# Patient Record
Sex: FEMALE
[De-identification: names, ages, dates, MRNs, and addresses within clinical notes are randomized per-mention and may not be internally consistent; named-entity substitution may affect disease eponyms.]

---

## 2018-12-11 PROBLEM — Z00.00 ENCOUNTER FOR PREVENTIVE HEALTH EXAMINATION: Status: ACTIVE | Noted: 2018-12-11

## 2018-12-12 ENCOUNTER — APPOINTMENT (OUTPATIENT)
Dept: PULMONOLOGY | Facility: CLINIC | Age: 83
End: 2018-12-12

## 2019-01-03 ENCOUNTER — APPOINTMENT (OUTPATIENT)
Dept: PULMONOLOGY | Facility: CLINIC | Age: 84
End: 2019-01-03
Payer: MEDICARE

## 2019-01-03 VITALS
DIASTOLIC BLOOD PRESSURE: 82 MMHG | BODY MASS INDEX: 35.68 KG/M2 | OXYGEN SATURATION: 97 % | TEMPERATURE: 98.1 F | HEART RATE: 60 BPM | WEIGHT: 189 LBS | SYSTOLIC BLOOD PRESSURE: 120 MMHG | RESPIRATION RATE: 16 BRPM | HEIGHT: 61 IN

## 2019-01-03 DIAGNOSIS — R06.02 SHORTNESS OF BREATH: ICD-10-CM

## 2019-01-03 DIAGNOSIS — Z86.39 PERSONAL HISTORY OF OTHER ENDOCRINE, NUTRITIONAL AND METABOLIC DISEASE: ICD-10-CM

## 2019-01-03 DIAGNOSIS — Z78.9 OTHER SPECIFIED HEALTH STATUS: ICD-10-CM

## 2019-01-03 DIAGNOSIS — Z87.891 PERSONAL HISTORY OF NICOTINE DEPENDENCE: ICD-10-CM

## 2019-01-03 DIAGNOSIS — Z86.79 PERSONAL HISTORY OF OTHER DISEASES OF THE CIRCULATORY SYSTEM: ICD-10-CM

## 2019-01-03 DIAGNOSIS — R05 COUGH: ICD-10-CM

## 2019-01-03 PROCEDURE — 99204 OFFICE O/P NEW MOD 45 MIN: CPT | Mod: 25

## 2019-01-03 PROCEDURE — 94729 DIFFUSING CAPACITY: CPT

## 2019-01-03 PROCEDURE — 88738 HGB QUANT TRANSCUTANEOUS: CPT

## 2019-01-03 PROCEDURE — 94060 EVALUATION OF WHEEZING: CPT

## 2019-01-03 PROCEDURE — 94727 GAS DIL/WSHOT DETER LNG VOL: CPT

## 2019-01-03 RX ORDER — ATENOLOL 50 MG/1
TABLET ORAL
Refills: 0 | Status: ACTIVE | COMMUNITY

## 2019-01-03 RX ORDER — SIMVASTATIN 80 MG/1
TABLET, FILM COATED ORAL
Refills: 0 | Status: ACTIVE | COMMUNITY

## 2019-01-29 ENCOUNTER — INPATIENT (INPATIENT)
Facility: HOSPITAL | Age: 84
LOS: 2 days | Discharge: HOME | End: 2019-02-01
Attending: GENERAL PRACTICE | Admitting: GENERAL PRACTICE
Payer: MEDICARE

## 2019-01-29 VITALS
OXYGEN SATURATION: 98 % | TEMPERATURE: 98 F | SYSTOLIC BLOOD PRESSURE: 133 MMHG | HEART RATE: 100 BPM | RESPIRATION RATE: 20 BRPM | DIASTOLIC BLOOD PRESSURE: 90 MMHG

## 2019-01-29 LAB
ALBUMIN SERPL ELPH-MCNC: 4.1 G/DL — SIGNIFICANT CHANGE UP (ref 3.3–5)
ALP SERPL-CCNC: 61 U/L — SIGNIFICANT CHANGE UP (ref 40–120)
ALT FLD-CCNC: 16 U/L — SIGNIFICANT CHANGE UP (ref 4–33)
ANION GAP SERPL CALC-SCNC: 18 MMO/L — HIGH (ref 7–14)
AST SERPL-CCNC: 36 U/L — HIGH (ref 4–32)
B PERT DNA SPEC QL NAA+PROBE: NOT DETECTED — SIGNIFICANT CHANGE UP
BASE EXCESS BLDV CALC-SCNC: 0.7 MMOL/L — SIGNIFICANT CHANGE UP
BASOPHILS # BLD AUTO: 0.04 K/UL — SIGNIFICANT CHANGE UP (ref 0–0.2)
BASOPHILS NFR BLD AUTO: 0.4 % — SIGNIFICANT CHANGE UP (ref 0–2)
BILIRUB SERPL-MCNC: 0.3 MG/DL — SIGNIFICANT CHANGE UP (ref 0.2–1.2)
BLOOD GAS VENOUS - CREATININE: 1.03 MG/DL — SIGNIFICANT CHANGE UP (ref 0.5–1.3)
BUN SERPL-MCNC: 20 MG/DL — SIGNIFICANT CHANGE UP (ref 7–23)
C PNEUM DNA SPEC QL NAA+PROBE: NOT DETECTED — SIGNIFICANT CHANGE UP
CALCIUM SERPL-MCNC: 10.1 MG/DL — SIGNIFICANT CHANGE UP (ref 8.4–10.5)
CHLORIDE BLDV-SCNC: 106 MMOL/L — SIGNIFICANT CHANGE UP (ref 96–108)
CHLORIDE SERPL-SCNC: 101 MMOL/L — SIGNIFICANT CHANGE UP (ref 98–107)
CO2 SERPL-SCNC: 22 MMOL/L — SIGNIFICANT CHANGE UP (ref 22–31)
CREAT SERPL-MCNC: 1.06 MG/DL — SIGNIFICANT CHANGE UP (ref 0.5–1.3)
EOSINOPHIL # BLD AUTO: 0.51 K/UL — HIGH (ref 0–0.5)
EOSINOPHIL NFR BLD AUTO: 5 % — SIGNIFICANT CHANGE UP (ref 0–6)
FLUAV H1 2009 PAND RNA SPEC QL NAA+PROBE: NOT DETECTED — SIGNIFICANT CHANGE UP
FLUAV H1 RNA SPEC QL NAA+PROBE: NOT DETECTED — SIGNIFICANT CHANGE UP
FLUAV H3 RNA SPEC QL NAA+PROBE: NOT DETECTED — SIGNIFICANT CHANGE UP
FLUAV SUBTYP SPEC NAA+PROBE: NOT DETECTED — SIGNIFICANT CHANGE UP
FLUBV RNA SPEC QL NAA+PROBE: NOT DETECTED — SIGNIFICANT CHANGE UP
GAS PNL BLDV: 137 MMOL/L — SIGNIFICANT CHANGE UP (ref 136–146)
GLUCOSE BLDV-MCNC: 157 — HIGH (ref 70–99)
GLUCOSE SERPL-MCNC: 151 MG/DL — HIGH (ref 70–99)
HADV DNA SPEC QL NAA+PROBE: NOT DETECTED — SIGNIFICANT CHANGE UP
HCO3 BLDV-SCNC: 25 MMOL/L — SIGNIFICANT CHANGE UP (ref 20–27)
HCOV PNL SPEC NAA+PROBE: SIGNIFICANT CHANGE UP
HCT VFR BLD CALC: 38.7 % — SIGNIFICANT CHANGE UP (ref 34.5–45)
HCT VFR BLDV CALC: 39.4 % — SIGNIFICANT CHANGE UP (ref 34.5–45)
HGB BLD-MCNC: 12.6 G/DL — SIGNIFICANT CHANGE UP (ref 11.5–15.5)
HGB BLDV-MCNC: 12.8 G/DL — SIGNIFICANT CHANGE UP (ref 11.5–15.5)
HMPV RNA SPEC QL NAA+PROBE: NOT DETECTED — SIGNIFICANT CHANGE UP
HPIV1 RNA SPEC QL NAA+PROBE: NOT DETECTED — SIGNIFICANT CHANGE UP
HPIV2 RNA SPEC QL NAA+PROBE: NOT DETECTED — SIGNIFICANT CHANGE UP
HPIV3 RNA SPEC QL NAA+PROBE: NOT DETECTED — SIGNIFICANT CHANGE UP
HPIV4 RNA SPEC QL NAA+PROBE: NOT DETECTED — SIGNIFICANT CHANGE UP
IMM GRANULOCYTES NFR BLD AUTO: 0.4 % — SIGNIFICANT CHANGE UP (ref 0–1.5)
LACTATE BLDV-MCNC: 2.7 MMOL/L — HIGH (ref 0.5–2)
LYMPHOCYTES # BLD AUTO: 2.2 K/UL — SIGNIFICANT CHANGE UP (ref 1–3.3)
LYMPHOCYTES # BLD AUTO: 21.7 % — SIGNIFICANT CHANGE UP (ref 13–44)
MCHC RBC-ENTMCNC: 31.4 PG — SIGNIFICANT CHANGE UP (ref 27–34)
MCHC RBC-ENTMCNC: 32.6 % — SIGNIFICANT CHANGE UP (ref 32–36)
MCV RBC AUTO: 96.5 FL — SIGNIFICANT CHANGE UP (ref 80–100)
MONOCYTES # BLD AUTO: 0.63 K/UL — SIGNIFICANT CHANGE UP (ref 0–0.9)
MONOCYTES NFR BLD AUTO: 6.2 % — SIGNIFICANT CHANGE UP (ref 2–14)
NEUTROPHILS # BLD AUTO: 6.7 K/UL — SIGNIFICANT CHANGE UP (ref 1.8–7.4)
NEUTROPHILS NFR BLD AUTO: 66.3 % — SIGNIFICANT CHANGE UP (ref 43–77)
NRBC # FLD: 0 K/UL — LOW (ref 25–125)
NT-PROBNP SERPL-SCNC: 337.3 PG/ML — SIGNIFICANT CHANGE UP
PCO2 BLDV: 41 MMHG — SIGNIFICANT CHANGE UP (ref 41–51)
PH BLDV: 7.41 PH — SIGNIFICANT CHANGE UP (ref 7.32–7.43)
PLATELET # BLD AUTO: 253 K/UL — SIGNIFICANT CHANGE UP (ref 150–400)
PMV BLD: 10.5 FL — SIGNIFICANT CHANGE UP (ref 7–13)
PO2 BLDV: 50 MMHG — HIGH (ref 35–40)
POTASSIUM BLDV-SCNC: SIGNIFICANT CHANGE UP MMOL/L (ref 3.4–4.5)
POTASSIUM SERPL-MCNC: 5.2 MMOL/L — SIGNIFICANT CHANGE UP (ref 3.5–5.3)
POTASSIUM SERPL-SCNC: 5.2 MMOL/L — SIGNIFICANT CHANGE UP (ref 3.5–5.3)
PROT SERPL-MCNC: 7.8 G/DL — SIGNIFICANT CHANGE UP (ref 6–8.3)
RBC # BLD: 4.01 M/UL — SIGNIFICANT CHANGE UP (ref 3.8–5.2)
RBC # FLD: 13.3 % — SIGNIFICANT CHANGE UP (ref 10.3–14.5)
RSV RNA SPEC QL NAA+PROBE: NOT DETECTED — SIGNIFICANT CHANGE UP
RV+EV RNA SPEC QL NAA+PROBE: NOT DETECTED — SIGNIFICANT CHANGE UP
SAO2 % BLDV: 85.4 % — HIGH (ref 60–85)
SODIUM SERPL-SCNC: 141 MMOL/L — SIGNIFICANT CHANGE UP (ref 135–145)
WBC # BLD: 10.12 K/UL — SIGNIFICANT CHANGE UP (ref 3.8–10.5)
WBC # FLD AUTO: 10.12 K/UL — SIGNIFICANT CHANGE UP (ref 3.8–10.5)

## 2019-01-29 PROCEDURE — 71046 X-RAY EXAM CHEST 2 VIEWS: CPT | Mod: 26

## 2019-01-29 RX ORDER — IPRATROPIUM/ALBUTEROL SULFATE 18-103MCG
3 AEROSOL WITH ADAPTER (GRAM) INHALATION ONCE
Qty: 0 | Refills: 0 | Status: COMPLETED | OUTPATIENT
Start: 2019-01-29 | End: 2019-01-29

## 2019-01-29 RX ORDER — SODIUM CHLORIDE 9 MG/ML
1000 INJECTION INTRAMUSCULAR; INTRAVENOUS; SUBCUTANEOUS ONCE
Qty: 0 | Refills: 0 | Status: COMPLETED | OUTPATIENT
Start: 2019-01-29 | End: 2019-01-29

## 2019-01-29 RX ADMIN — SODIUM CHLORIDE 1000 MILLILITER(S): 9 INJECTION INTRAMUSCULAR; INTRAVENOUS; SUBCUTANEOUS at 21:54

## 2019-01-29 RX ADMIN — Medication 60 MILLIGRAM(S): at 19:11

## 2019-01-29 RX ADMIN — Medication 3 MILLILITER(S): at 21:54

## 2019-01-29 RX ADMIN — Medication 3 MILLILITER(S): at 19:11

## 2019-01-29 NOTE — ED PROVIDER NOTE - MEDICAL DECISION MAKING DETAILS
wheeze, sob, cough->likely new onset copd vs. pna vs. viral syndrome->cxr,rvp,duonebs, steroids, reassess,

## 2019-01-29 NOTE — ED ADULT TRIAGE NOTE - CHIEF COMPLAINT QUOTE
Pt brought in by EMS from home complaining of SOB and wheezing and cough x few days. Pt denies chest pain, N/V/D, fever or chills.

## 2019-01-29 NOTE — ED PROVIDER NOTE - CARE PLAN
Principal Discharge DX:	Wheezing  Secondary Diagnosis:	Cough  Secondary Diagnosis:	Shortness of breath

## 2019-01-29 NOTE — ED PROVIDER NOTE - OBJECTIVE STATEMENT
83yF hx htn, hld p/w 3 wks cough productive of white sputum, shortness of breath, progressively worsening. Pt saw PMD 3wks ago and was given abx but has had persistent symptoms since. Denies cp. SOB worse with exertion. No fever. Pt endorses general exhaustion which she attributes to frequent coughing

## 2019-01-29 NOTE — ED PROVIDER NOTE - PROGRESS NOTE DETAILS
PA Valane: Pt stable, feeling slightly better however still wheezing across all fields. RVP pending, no other acute changes. ANAY Villafana: Called PT's PMD awaiting callback. ANAY Villafana: Called pt's pulmonologist Dr. Reinoso, pt may be a good pulm unit candidate, paged Dr. Reinoso awaiting callback. ANAY Villafana, SPoke to sloane Sandoval with pulm unit admission if bed available. ANAY Villafana: [facility], requests CT/CTA to eval for pneumonia/PE but accepts admission, pt stable.

## 2019-01-29 NOTE — ED ADULT NURSE NOTE - NSIMPLEMENTINTERV_GEN_ALL_ED
Implemented All Fall with Harm Risk Interventions:  [place] to call system. Call bell, personal items and telephone within reach. Instruct patient to call for assistance. Room bathroom lighting operational. Non-slip footwear when patient is off stretcher. Physically safe environment: no spills, clutter or unnecessary equipment. Stretcher in lowest position, wheels locked, appropriate side rails in place. Provide visual cue, wrist band, yellow gown, etc. Monitor gait and stability. Monitor for mental status changes and reorient to person, place, and time. Review medications for side effects contributing to fall risk. Reinforce activity limits and safety measures with patient and family. Provide visual clues: red socks.

## 2019-01-30 DIAGNOSIS — R06.02 SHORTNESS OF BREATH: ICD-10-CM

## 2019-01-30 DIAGNOSIS — E78.5 HYPERLIPIDEMIA, UNSPECIFIED: ICD-10-CM

## 2019-01-30 DIAGNOSIS — I10 ESSENTIAL (PRIMARY) HYPERTENSION: ICD-10-CM

## 2019-01-30 DIAGNOSIS — Z29.9 ENCOUNTER FOR PROPHYLACTIC MEASURES, UNSPECIFIED: ICD-10-CM

## 2019-01-30 DIAGNOSIS — Z79.899 OTHER LONG TERM (CURRENT) DRUG THERAPY: ICD-10-CM

## 2019-01-30 DIAGNOSIS — E03.9 HYPOTHYROIDISM, UNSPECIFIED: ICD-10-CM

## 2019-01-30 DIAGNOSIS — R06.2 WHEEZING: ICD-10-CM

## 2019-01-30 LAB
CK MB BLD-MCNC: 1.4 — SIGNIFICANT CHANGE UP (ref 0–2.5)
CK MB BLD-MCNC: 2.28 NG/ML — SIGNIFICANT CHANGE UP (ref 1–4.7)
CK SERPL-CCNC: 165 U/L — SIGNIFICANT CHANGE UP (ref 25–170)
MAGNESIUM SERPL-MCNC: 2.3 MG/DL — SIGNIFICANT CHANGE UP (ref 1.6–2.6)
TROPONIN T, HIGH SENSITIVITY: 18 NG/L — SIGNIFICANT CHANGE UP (ref ?–14)
TSH SERPL-MCNC: 2.47 UIU/ML — SIGNIFICANT CHANGE UP (ref 0.27–4.2)

## 2019-01-30 PROCEDURE — 99223 1ST HOSP IP/OBS HIGH 75: CPT

## 2019-01-30 PROCEDURE — 71275 CT ANGIOGRAPHY CHEST: CPT | Mod: 26

## 2019-01-30 PROCEDURE — 93306 TTE W/DOPPLER COMPLETE: CPT | Mod: 26

## 2019-01-30 RX ORDER — MAGNESIUM SULFATE 500 MG/ML
1 VIAL (ML) INJECTION ONCE
Qty: 0 | Refills: 0 | Status: COMPLETED | OUTPATIENT
Start: 2019-01-30 | End: 2019-01-30

## 2019-01-30 RX ORDER — SIMVASTATIN 20 MG/1
20 TABLET, FILM COATED ORAL AT BEDTIME
Qty: 0 | Refills: 0 | Status: DISCONTINUED | OUTPATIENT
Start: 2019-01-30 | End: 2019-02-01

## 2019-01-30 RX ORDER — SENNA PLUS 8.6 MG/1
2 TABLET ORAL AT BEDTIME
Qty: 0 | Refills: 0 | Status: DISCONTINUED | OUTPATIENT
Start: 2019-01-30 | End: 2019-02-01

## 2019-01-30 RX ORDER — ASPIRIN/CALCIUM CARB/MAGNESIUM 324 MG
1 TABLET ORAL
Qty: 0 | Refills: 0 | COMMUNITY

## 2019-01-30 RX ORDER — ASPIRIN/CALCIUM CARB/MAGNESIUM 324 MG
81 TABLET ORAL DAILY
Qty: 0 | Refills: 0 | Status: DISCONTINUED | OUTPATIENT
Start: 2019-01-30 | End: 2019-02-01

## 2019-01-30 RX ORDER — BUDESONIDE, MICRONIZED 100 %
0.5 POWDER (GRAM) MISCELLANEOUS
Qty: 0 | Refills: 0 | Status: DISCONTINUED | OUTPATIENT
Start: 2019-01-30 | End: 2019-02-01

## 2019-01-30 RX ORDER — LEVALBUTEROL 1.25 MG/.5ML
0.63 SOLUTION, CONCENTRATE RESPIRATORY (INHALATION) ONCE
Qty: 0 | Refills: 0 | Status: COMPLETED | OUTPATIENT
Start: 2019-01-30 | End: 2019-01-30

## 2019-01-30 RX ORDER — DOCUSATE SODIUM 100 MG
100 CAPSULE ORAL
Qty: 0 | Refills: 0 | Status: DISCONTINUED | OUTPATIENT
Start: 2019-01-30 | End: 2019-02-01

## 2019-01-30 RX ORDER — POLYETHYLENE GLYCOL 3350 17 G/17G
17 POWDER, FOR SOLUTION ORAL DAILY
Qty: 0 | Refills: 0 | Status: DISCONTINUED | OUTPATIENT
Start: 2019-01-30 | End: 2019-02-01

## 2019-01-30 RX ORDER — CHOLECALCIFEROL (VITAMIN D3) 125 MCG
1 CAPSULE ORAL
Qty: 0 | Refills: 0 | COMMUNITY

## 2019-01-30 RX ORDER — LANOLIN ALCOHOL/MO/W.PET/CERES
3 CREAM (GRAM) TOPICAL AT BEDTIME
Qty: 0 | Refills: 0 | Status: DISCONTINUED | OUTPATIENT
Start: 2019-01-30 | End: 2019-02-01

## 2019-01-30 RX ORDER — LEVALBUTEROL 1.25 MG/.5ML
0.63 SOLUTION, CONCENTRATE RESPIRATORY (INHALATION) EVERY 8 HOURS
Qty: 0 | Refills: 0 | Status: COMPLETED | OUTPATIENT
Start: 2019-01-30 | End: 2019-01-30

## 2019-01-30 RX ORDER — ATENOLOL 25 MG/1
25 TABLET ORAL DAILY
Qty: 0 | Refills: 0 | Status: DISCONTINUED | OUTPATIENT
Start: 2019-01-30 | End: 2019-01-30

## 2019-01-30 RX ORDER — IPRATROPIUM/ALBUTEROL SULFATE 18-103MCG
3 AEROSOL WITH ADAPTER (GRAM) INHALATION EVERY 6 HOURS
Qty: 0 | Refills: 0 | Status: DISCONTINUED | OUTPATIENT
Start: 2019-01-30 | End: 2019-01-30

## 2019-01-30 RX ORDER — METOPROLOL TARTRATE 50 MG
25 TABLET ORAL DAILY
Qty: 0 | Refills: 0 | Status: DISCONTINUED | OUTPATIENT
Start: 2019-01-31 | End: 2019-02-01

## 2019-01-30 RX ORDER — HYDROCORTISONE 1 %
1 OINTMENT (GRAM) TOPICAL
Qty: 0 | Refills: 0 | COMMUNITY

## 2019-01-30 RX ADMIN — Medication 3 MILLIGRAM(S): at 23:14

## 2019-01-30 RX ADMIN — Medication 100 GRAM(S): at 03:22

## 2019-01-30 RX ADMIN — Medication 100 MILLIGRAM(S): at 12:22

## 2019-01-30 RX ADMIN — Medication 100 MILLIGRAM(S): at 19:03

## 2019-01-30 RX ADMIN — Medication 100 MILLIGRAM(S): at 00:14

## 2019-01-30 RX ADMIN — LEVALBUTEROL 0.63 MILLIGRAM(S): 1.25 SOLUTION, CONCENTRATE RESPIRATORY (INHALATION) at 05:00

## 2019-01-30 RX ADMIN — Medication 81 MILLIGRAM(S): at 12:22

## 2019-01-30 RX ADMIN — LEVALBUTEROL 0.63 MILLIGRAM(S): 1.25 SOLUTION, CONCENTRATE RESPIRATORY (INHALATION) at 10:02

## 2019-01-30 RX ADMIN — POLYETHYLENE GLYCOL 3350 17 GRAM(S): 17 POWDER, FOR SOLUTION ORAL at 19:03

## 2019-01-30 RX ADMIN — Medication 100 MILLIGRAM(S): at 19:04

## 2019-01-30 RX ADMIN — Medication 0.5 MILLIGRAM(S): at 22:05

## 2019-01-30 RX ADMIN — ATENOLOL 25 MILLIGRAM(S): 25 TABLET ORAL at 06:44

## 2019-01-30 RX ADMIN — SENNA PLUS 2 TABLET(S): 8.6 TABLET ORAL at 23:14

## 2019-01-30 RX ADMIN — SIMVASTATIN 20 MILLIGRAM(S): 20 TABLET, FILM COATED ORAL at 23:14

## 2019-01-30 RX ADMIN — Medication 100 MILLIGRAM(S): at 03:22

## 2019-01-30 RX ADMIN — LEVALBUTEROL 0.63 MILLIGRAM(S): 1.25 SOLUTION, CONCENTRATE RESPIRATORY (INHALATION) at 22:03

## 2019-01-30 NOTE — H&P ADULT - PROBLEM SELECTOR PLAN 3
obtain synmtrroid dose from [facility]  tsh add-on pending obtain synthroid dose from pharmacy  tsh add-on pending

## 2019-01-30 NOTE — CONSULT NOTE ADULT - SUBJECTIVE AND OBJECTIVE BOX
CHIEF COMPLAINT: sob    HISTORY OF PRESENT ILLNESS:  84 yo f h/o obesity, htn, hld, hypothyroidism. Reports several weeks of productive cough associated with sob and wheezing that is mainly provoked by the cough. Symptoms consistently worse at night when in bed. Former smoker for 15 years 1/2 ppd 50 years ago. No apparent h/o lung disease.  Denies cp, lightheadedness, diaphoresis.  Denies h/o cardiac disease,  Pt was seen by pulmonary ( Dr Reinoso) Jan 3rd, who noted essentially normal PFTs, and CT chest with mild emphysema and subcentimeter lung nodules. Pt reports being placed on abx, unsure which but no improvement. Denies fevers, hemoptysis, limb swelling, recent travel, h/o cancer or blood clots. In ED, cxr prelim and RVP negative.    Allergies  No Known Allergies      MEDICATIONS:  aspirin enteric coated 81 milliGRAM(s) Oral daily  ATENolol  Tablet 25 milliGRAM(s) Oral daily  guaiFENesin    Syrup 100 milliGRAM(s) Oral every 6 hours PRN  levalbuterol Inhalation 0.63 milliGRAM(s) Inhalation every 8 hours  simvastatin 20 milliGRAM(s) Oral at bedtime        PAST MEDICAL & SURGICAL HISTORY:  Hyperlipidemia, unspecified hyperlipidemia type  HTN (hypertension)      FAMILY HISTORY:  No pertinent family history in first degree relatives      SOCIAL HISTORY:    former smoker. independent in adl's      REVIEW OF SYSTEMS:  See HPI, otherwise complete 10 point review of systems negative    [ ] All others negative	    PHYSICAL EXAM:  T(C): 36.4 (01-30-19 @ 04:33), Max: 36.9 (01-29-19 @ 21:35)  HR: 116 (01-30-19 @ 06:18) (90 - 116)  BP: 157/95 (01-30-19 @ 06:18) (133/90 - 165/71)  RR: 20 (01-30-19 @ 06:18) (18 - 22)  SpO2: 97% (01-30-19 @ 06:18) (96% - 98%)  Wt(kg): --  I&O's Summary      Appearance: No Acute Distress	  HEENT:  Normal oral mucosa, PERRL, EOMI	  Cardiovascular: Normal S1 S2, No JVD, No murmurs/rubs/gallops  Respiratory: Lungs clear to auscultation bilaterally  Gastrointestinal:  Soft, Non-tender, + BS	  Skin: No rashes, No ecchymoses, No cyanosis	  Neurologic: Non-focal  Extremities: No clubbing, cyanosis or edema  Vascular: Peripheral pulses palpable 2+ bilaterally  Psychiatry: A & O x 3, Mood & affect appropriate    Laboratory Data:	 	    CBC Full  -  ( 29 Jan 2019 17:43 )  WBC Count : 10.12 K/uL  Hemoglobin : 12.6 g/dL  Hematocrit : 38.7 %  Platelet Count - Automated : 253 K/uL  Mean Cell Volume : 96.5 fL  Mean Cell Hemoglobin : 31.4 pg  Mean Cell Hemoglobin Concentration : 32.6 %  Auto Neutrophil # : 6.70 K/uL  Auto Lymphocyte # : 2.20 K/uL  Auto Monocyte # : 0.63 K/uL  Auto Eosinophil # : 0.51 K/uL  Auto Basophil # : 0.04 K/uL  Auto Neutrophil % : 66.3 %  Auto Lymphocyte % : 21.7 %  Auto Monocyte % : 6.2 %  Auto Eosinophil % : 5.0 %  Auto Basophil % : 0.4 %    01-29    141  |  101  |  20  ----------------------------<  151<H>  5.2   |  22  |  1.06    Ca    10.1      29 Jan 2019 17:43  Mg     2.3     01-29    TPro  7.8  /  Alb  4.1  /  TBili  0.3  /  DBili  x   /  AST  36<H>  /  ALT  16  /  AlkPhos  61  01-29      proBNP: Serum Pro-Brain Natriuretic Peptide: 337.3 pg/mL (01-29 @ 17:43)        Interpretation of Telemetry: nsr	          Assessment:  -emphysema  -wheezing, shortness of breath  -distal mucoid impacted bronchi in the right lower lobe on ct chest  -dilated PA suggestive of PAH    Recs:  -appears euvolemic. BNP mildly elevated. cont to hold diuretics at this time  -fu pulmonary/dr hunter recs. consider trial of bronchodilators and steroid taper  -would check procalcitonin  -some findings suggestive of PAH on CT. likely 2/2 emphysema and body habitus. would obtain TTE to evaluate pulmonary pressures  -on atenolol at home. would change to a beta selective beta blocker to minimize bronchospasm (toprol xl 25mg)      Greater than 60 minutes spent on total encounter; more than 50% of the visit was spent counseling and/or coordinating care by the attending physician.   	  Christiano Abarca MD   Cardiovascular Diseases  [phone]

## 2019-01-30 NOTE — H&P ADULT - NSHPREVIEWOFSYSTEMS_GEN_ALL_CORE
Review of Systems:   CONSTITUTIONAL: No fever  EYES: No eye pain, visual disturbances, or discharge  ENMT:  No difficulty hearing, tinnitus, vertigo; No sinus or throat pain  NECK: No pain or stiffness  RESPIRATORY:  cough, wheezing, No chills or hemoptysis; + shortness of breath  CARDIOVASCULAR: No chest pain, palpitations, dizziness, or leg swelling  GASTROINTESTINAL: No abdominal or epigastric pain. No nausea, vomiting, or hematemesis; No diarrhea or constipation. No melena or hematochezia.  GENITOURINARY: No dysuria, frequency, hematuria, or incontinence  NEUROLOGICAL: No headaches, memory loss, loss of strength, numbness, or tremors  SKIN: No itching, burning, rashes, or lesions   LYMPH NODES: No enlarged glands  ENDOCRINE: No heat or cold intolerance; No hair loss  MUSCULOSKELETAL: No joint pain or swelling

## 2019-01-30 NOTE — ED ADULT NURSE REASSESSMENT NOTE - NS ED NURSE REASSESS COMMENT FT1
pericare provided for urinary incontinence. Bedpan used to void. pt repositioned and taken to CT scan

## 2019-01-30 NOTE — H&P ADULT - NSHPPHYSICALEXAM_GEN_ALL_CORE
PHYSICAL EXAM:      Constitutional: NAD, well-groomed, well-developed  HEENT: EOMI, Normal Hearing  Neck: No LAD, No JVD  Back: Normal spine flexure, No CVA tenderness  Respiratory: Mild diffuse expiratory wheezes   Cardiovascular: S1 and S2, tachycardic   Gastrointestinal: BS+, soft, NT/ND  Extremities: mild left leg swelling compared to right  Vascular: 2+ peripheral pulses  Neurological: A/O x 3, no focal deficits  Psychiatric: Normal mood, normal affect  Musculoskeletal: 5/5 strength b/l upper and lower extremities  Skin: No rashes

## 2019-01-30 NOTE — H&P ADULT - NSHPLABSRESULTS_GEN_ALL_CORE
12.6   10.12 )-----------( 253      ( 29 Jan 2019 17:43 )             38.7     01-29    141  |  101  |  20  ----------------------------<  151<H>  5.2   |  22  |  1.06    Ca    10.1      29 Jan 2019 17:43  Mg     2.3     01-29    TPro  7.8  /  Alb  4.1  /  TBili  0.3  /  DBili  x   /  AST  36<H>  /  ALT  16  /  AlkPhos  61  01-29    CAPILLARY BLOOD GLUCOSE            Vital Signs Last 24 Hrs  T(C): 36.9 (30 Jan 2019 00:56), Max: 36.9 (29 Jan 2019 21:35)  T(F): 98.4 (30 Jan 2019 00:56), Max: 98.5 (29 Jan 2019 21:35)  HR: 102 (30 Jan 2019 00:56) (90 - 102)  BP: 165/71 (30 Jan 2019 00:56) (133/90 - 165/71)  BP(mean): --  RR: 20 (30 Jan 2019 00:56) (18 - 22)  SpO2: 98% (30 Jan 2019 00:56) (96% - 98%)

## 2019-01-30 NOTE — H&P ADULT - PROBLEM SELECTOR PLAN 6
IMPROVE VTE Individual Risk Assessment    RISK                                                          Points  [] Previous VTE                                           3  [] Thrombophilia                                        2  [] Lower limb paralysis                              2   [] Current Cancer                                       2   [] Immobilization > 24 hrs                        1  [] ICU/CCU stay > 24 hours                       1  [x] Age > 60                                                   1    IMPROVE VTE Score: 1  ppx lovenox if CTA negative

## 2019-01-30 NOTE — H&P ADULT - HISTORY OF PRESENT ILLNESS
84 yo f h/o htn, hld, hypothyroidism. Reports several weeks of productive cough associated with sob that is mainly provoked by the cough. Former smoker for 15 years 1/2 ppd 50 years ago. No apparent h/o lung disease.  Denies cp, lightheadedness, diaphoresis.  Denies h/o cardiac disease,  Pt was seen by pulmonary ( Dr Reinoso) Jan 3rd, who noted essentially normal PFTs, and CT chest. Pt reports being placed on abx, unsure which but no improvement. Denies fevers, hemoptysis, limb swelling, recent travel, h/o cancer or blood clots. 84 yo f h/o htn, hld, hypothyroidism. Reports several weeks of productive cough associated with sob that is mainly provoked by the cough. Former smoker for 15 years 1/2 ppd 50 years ago. No apparent h/o lung disease.  Denies cp, lightheadedness, diaphoresis.  Denies h/o cardiac disease,  Pt was seen by pulmonary ( Dr Reinoso) Jan 3rd, who noted essentially normal PFTs, and CT chest. Pt reports being placed on abx, unsure which but no improvement. Denies fevers, hemoptysis, limb swelling, recent travel, h/o cancer or blood clots. In ED, cxr prelim and RVP negative. 82 yo f h/o htn, hld, hypothyroidism. Reports several weeks of productive cough associated with sob and wheezing that is mainly provoked by the cough. Symptoms consistently worse at night when in bed. Former smoker for 15 years 1/2 ppd 50 years ago. No apparent h/o lung disease.  Denies cp, lightheadedness, diaphoresis.  Denies h/o cardiac disease,  Pt was seen by pulmonary ( Dr Reinoso) Jan 3rd, who noted essentially normal PFTs, and CT chest. Pt reports being placed on abx, unsure which but no improvement. Denies fevers, hemoptysis, limb swelling, recent travel, h/o cancer or blood clots. In ED, cxr prelim and RVP negative. 82 yo f h/o obesity, htn, hld, hypothyroidism. Reports several weeks of productive cough associated with sob and wheezing that is mainly provoked by the cough. Symptoms consistently worse at night when in bed. Former smoker for 15 years 1/2 ppd 50 years ago. No apparent h/o lung disease.  Denies cp, lightheadedness, diaphoresis.  Denies h/o cardiac disease,  Pt was seen by pulmonary ( Dr Reinoso) Jan 3rd, who noted essentially normal PFTs, and CT chest. Pt reports being placed on abx, unsure which but no improvement. Denies fevers, hemoptysis, limb swelling, recent travel, h/o cancer or blood clots. In ED, cxr prelim and RVP negative.

## 2019-01-30 NOTE — H&P ADULT - PROBLEM SELECTOR PLAN 1
-possibly stemming from URI-provoked cough. However, given mild tachycardia, relative immobility, and clear cxr, would order CTPA to r/o PE. Will additionally add cardiac enzymes to initial labs drawn, as pt with multiple CAD risk factors  -will place on standing xopenex, Robitussin, chest PT, supplemental O2 -possibly stemming from URI-provoked cough. However, given mild tachycardia, relative immobility, and clear cxr, would order CTPA to r/o PE. Will additionally add cardiac enzymes to initial labs drawn, as pt with multiple CAD risk factors. TTE as pt reports worsening symptoms at night when in bed   -will place on standing xopenex, Robitussin, chest PT, supplemental O2

## 2019-01-31 LAB
ANION GAP SERPL CALC-SCNC: 13 MMO/L — SIGNIFICANT CHANGE UP (ref 7–14)
BUN SERPL-MCNC: 18 MG/DL — SIGNIFICANT CHANGE UP (ref 7–23)
CALCIUM SERPL-MCNC: 9.2 MG/DL — SIGNIFICANT CHANGE UP (ref 8.4–10.5)
CHLORIDE SERPL-SCNC: 106 MMOL/L — SIGNIFICANT CHANGE UP (ref 98–107)
CO2 SERPL-SCNC: 23 MMOL/L — SIGNIFICANT CHANGE UP (ref 22–31)
CREAT SERPL-MCNC: 1.03 MG/DL — SIGNIFICANT CHANGE UP (ref 0.5–1.3)
GLUCOSE SERPL-MCNC: 108 MG/DL — HIGH (ref 70–99)
HCT VFR BLD CALC: 37.3 % — SIGNIFICANT CHANGE UP (ref 34.5–45)
HGB BLD-MCNC: 11.4 G/DL — LOW (ref 11.5–15.5)
MCHC RBC-ENTMCNC: 30.5 PG — SIGNIFICANT CHANGE UP (ref 27–34)
MCHC RBC-ENTMCNC: 30.6 % — LOW (ref 32–36)
MCV RBC AUTO: 99.7 FL — SIGNIFICANT CHANGE UP (ref 80–100)
NRBC # FLD: 0 K/UL — LOW (ref 25–125)
PLATELET # BLD AUTO: 187 K/UL — SIGNIFICANT CHANGE UP (ref 150–400)
PMV BLD: 11.2 FL — SIGNIFICANT CHANGE UP (ref 7–13)
POTASSIUM SERPL-MCNC: 4 MMOL/L — SIGNIFICANT CHANGE UP (ref 3.5–5.3)
POTASSIUM SERPL-SCNC: 4 MMOL/L — SIGNIFICANT CHANGE UP (ref 3.5–5.3)
RBC # BLD: 3.74 M/UL — LOW (ref 3.8–5.2)
RBC # FLD: 13.9 % — SIGNIFICANT CHANGE UP (ref 10.3–14.5)
SODIUM SERPL-SCNC: 142 MMOL/L — SIGNIFICANT CHANGE UP (ref 135–145)
WBC # BLD: 8.62 K/UL — SIGNIFICANT CHANGE UP (ref 3.8–10.5)
WBC # FLD AUTO: 8.62 K/UL — SIGNIFICANT CHANGE UP (ref 3.8–10.5)

## 2019-01-31 PROCEDURE — 99233 SBSQ HOSP IP/OBS HIGH 50: CPT

## 2019-01-31 RX ORDER — OXYCODONE AND ACETAMINOPHEN 5; 325 MG/1; MG/1
1 TABLET ORAL ONCE
Qty: 0 | Refills: 0 | Status: DISCONTINUED | OUTPATIENT
Start: 2019-01-31 | End: 2019-01-31

## 2019-01-31 RX ORDER — ENOXAPARIN SODIUM 100 MG/ML
40 INJECTION SUBCUTANEOUS DAILY
Qty: 0 | Refills: 0 | Status: DISCONTINUED | OUTPATIENT
Start: 2019-01-31 | End: 2019-02-01

## 2019-01-31 RX ADMIN — Medication 0.5 MILLIGRAM(S): at 11:18

## 2019-01-31 RX ADMIN — Medication 25 MILLIGRAM(S): at 06:52

## 2019-01-31 RX ADMIN — Medication 100 MILLIGRAM(S): at 06:52

## 2019-01-31 RX ADMIN — OXYCODONE AND ACETAMINOPHEN 1 TABLET(S): 5; 325 TABLET ORAL at 16:34

## 2019-01-31 RX ADMIN — Medication 0.5 MILLIGRAM(S): at 20:46

## 2019-01-31 RX ADMIN — POLYETHYLENE GLYCOL 3350 17 GRAM(S): 17 POWDER, FOR SOLUTION ORAL at 09:36

## 2019-01-31 RX ADMIN — SENNA PLUS 2 TABLET(S): 8.6 TABLET ORAL at 20:41

## 2019-01-31 RX ADMIN — Medication 81 MILLIGRAM(S): at 11:18

## 2019-01-31 RX ADMIN — Medication 100 MILLIGRAM(S): at 18:11

## 2019-01-31 RX ADMIN — Medication 100 MILLIGRAM(S): at 03:01

## 2019-01-31 RX ADMIN — Medication 100 MILLIGRAM(S): at 18:10

## 2019-01-31 RX ADMIN — SIMVASTATIN 20 MILLIGRAM(S): 20 TABLET, FILM COATED ORAL at 20:40

## 2019-01-31 RX ADMIN — Medication 40 MILLIGRAM(S): at 14:10

## 2019-01-31 RX ADMIN — ENOXAPARIN SODIUM 40 MILLIGRAM(S): 100 INJECTION SUBCUTANEOUS at 12:57

## 2019-01-31 RX ADMIN — Medication 3 MILLIGRAM(S): at 21:15

## 2019-01-31 NOTE — PHYSICAL THERAPY INITIAL EVALUATION ADULT - ASR EQUIP NEEDS DISCH PT EVAL
To be determined upon further assessment of functional mobility. Please refer to PT notes for further assessment when feasible.

## 2019-01-31 NOTE — PHYSICAL THERAPY INITIAL EVALUATION ADULT - MANUAL MUSCLE TESTING RESULTS, REHAB EVAL
not tested due to/Pt. sleeping throughout therapy session, as per  requesting to HOLD functional mobility to allow pt. to sleep. Will assess when feasible.

## 2019-01-31 NOTE — PROGRESS NOTE ADULT - ASSESSMENT
Patient seen, examined, full note to follow. _________________________________________________________________________________________  ========>>  M E D I C A L   A T T E N D I N G    F O L L O W  U P  N O T E  <<=========  -----------------------------------------------------------------------------------------------------    - Patient seen and examined by me earlier today.   - In summary,  PRATIBHA MALIN is a 83y year old woman who originally presented with  SOB  - Patient today overall doing ok, comfortable, eating OK.  breathing better     ==================>> REVIEW OF SYSTEM <<=================    GEN: no fever, no chills, no pain  RESP: SOB improved , no cough, no sputum  CVS: no chest pain, no palpitations, no edema  GI: no abdominal pain, no nausea, no constipation, no diarrhea  : no dysuria, no frequency, no hematuria  Neuro: no headache, no dizziness  Derm : no itching, no rash    ==================>> PHYSICAL EXAM <<=================    GEN: A&O X 3 , NAD , comfortable in bed  HEENT: NCAT, PERRL, MMM, hearing intact  Neck: supple , no JVD  CVS: S1S2 , regular , No M/R/G appreciated  PULM: mild scattered rhonchi, minimal wheezing   ABD.: soft. non tender, non distended,  bowel sounds present, obese   Extrem: intact pulses , no edema   PSYCH : normal mood,  not anxious      ==================>> MEDICATIONS <<====================    MEDICATIONS  (STANDING):  aspirin enteric coated 81 milliGRAM(s) Oral daily  buDESOnide   0.5 milliGRAM(s) Respule 0.5 milliGRAM(s) Inhalation two times a day  docusate sodium 100 milliGRAM(s) Oral two times a day  enoxaparin Injectable 40 milliGRAM(s) SubCutaneous daily  metoprolol succinate ER 25 milliGRAM(s) Oral daily  polyethylene glycol 3350 17 Gram(s) Oral daily  predniSONE   Tablet 40 milliGRAM(s) Oral daily  senna 2 Tablet(s) Oral at bedtime  simvastatin 20 milliGRAM(s) Oral at bedtime    MEDICATIONS  (PRN):  guaiFENesin    Syrup 100 milliGRAM(s) Oral every 6 hours PRN Cough  melatonin 3 milliGRAM(s) Oral at bedtime PRN Insomnia    ==================>> VITAL SIGNS <<==================    T(C): 36.2 (01-31-19 @ 01:47), Max: 36.3 (01-30-19 @ 22:21)  HR: 83 (01-31-19 @ 14:16) (78 - 92)  BP: 146/64 (01-31-19 @ 14:16) (123/63 - 149/84)  RR: 18 (01-31-19 @ 14:16) (18 - 20)  SpO2: 100% (01-31-19 @ 14:16) (97% - 100%)     ==================>> LAB AND IMAGING <<==================                        11.4   8.62  )-----------( 187      ( 31 Jan 2019 01:30 )             37.3     142  |  106  |  18  ----------------------------<  108<H>  4.0   |  23  |  1.03    Ca    9.2      31 Jan 2019 01:30     TSH:      2.47   (01-29-19)           < from: CT Angio Chest w/ IV Cont (01.30.19 @ 03:12) >  LUNGS AND LARGE AIRWAYS: Patent central airways. No pulmonary nodules,   masses or consolidation  Left lower lobe calcified granuloma.   Centrilobular emphysema most prominent in the upper lobes. Bibasilar   linear atelectasis. Distal mucoid impacted bronchi in the right lower lobe.  < end of copied text >      < from: Transthoracic Echocardiogram (01.30.19 @ 16:59) >  CONCLUSIONS:  1. Increased relative wall thickness with normal left  ventricular mass index, consistent with concentric left  ventricular remodeling.  2. Normal left ventricular systolic function. No segmental  wall motion abnormalities.  3. Normal right ventricular size and function.  < end of copied text >  ___________________________________________________________________________________  ===============>>  A S S E S S M E N T   A N D   P L A N <<===============  ------------------------------------------------------------------------------------------    · Assessment		  84 yo f with sob/wheezing mainly provoked by productive cough over last several weeks      Problem/Plan - 1:  ·  Problem: SOB likely COPD exacerbation stemming from URI      PE ruled out        TTE showing chronic diastolic heart faiure  pulm predicated  continue medical mgmt and optimization   wean off O2 as able  OB as able     Problem/Plan - 2:  ·  Problem: Essential hypertension  cardio follow up and medical optimizing  monitor vitals    Problem/Plan - 3:  ·  Problem: Hypothyroidism  synthroid  tsh WNL    Problem/Plan - 4:  ·  Problem: Hyperlipidemia  statin    -GI/DVT Prophylaxis.  - PT / OOB   --------------------------------------------  Case discussed with pt and family  Education given on findings and plan of care  ___________________________  H. RIGO Carpio.  Pager: [phone]

## 2019-01-31 NOTE — PHYSICAL THERAPY INITIAL EVALUATION ADULT - PERTINENT HX OF CURRENT PROBLEM, REHAB EVAL
83 year old female with PMH: obesity, HTN, HLD, hypothyroidism. Reports several weeks of productive cough associated with sob and wheezing that is mainly provoked by the cough.

## 2019-01-31 NOTE — PHYSICAL THERAPY INITIAL EVALUATION ADULT - ORIENTATION, REHAB EVAL
secondary to pt. sleeping throughout therapy session, ROM assessment performed however, functional mobility held as per  request./unable to assess

## 2019-02-01 ENCOUNTER — TRANSCRIPTION ENCOUNTER (OUTPATIENT)
Age: 84
End: 2019-02-01

## 2019-02-01 VITALS
RESPIRATION RATE: 19 BRPM | HEART RATE: 87 BPM | TEMPERATURE: 98 F | OXYGEN SATURATION: 98 % | SYSTOLIC BLOOD PRESSURE: 151 MMHG | DIASTOLIC BLOOD PRESSURE: 67 MMHG

## 2019-02-01 LAB
ANION GAP SERPL CALC-SCNC: 15 MMO/L — HIGH (ref 7–14)
BUN SERPL-MCNC: 19 MG/DL — SIGNIFICANT CHANGE UP (ref 7–23)
CALCIUM SERPL-MCNC: 9.7 MG/DL — SIGNIFICANT CHANGE UP (ref 8.4–10.5)
CHLORIDE SERPL-SCNC: 104 MMOL/L — SIGNIFICANT CHANGE UP (ref 98–107)
CO2 SERPL-SCNC: 23 MMOL/L — SIGNIFICANT CHANGE UP (ref 22–31)
CREAT SERPL-MCNC: 1.01 MG/DL — SIGNIFICANT CHANGE UP (ref 0.5–1.3)
GLUCOSE SERPL-MCNC: 113 MG/DL — HIGH (ref 70–99)
HBA1C BLD-MCNC: 5.8 % — HIGH (ref 4–5.6)
HCT VFR BLD CALC: 35.7 % — SIGNIFICANT CHANGE UP (ref 34.5–45)
HGB BLD-MCNC: 11.7 G/DL — SIGNIFICANT CHANGE UP (ref 11.5–15.5)
MAGNESIUM SERPL-MCNC: 2.1 MG/DL — SIGNIFICANT CHANGE UP (ref 1.6–2.6)
MCHC RBC-ENTMCNC: 31 PG — SIGNIFICANT CHANGE UP (ref 27–34)
MCHC RBC-ENTMCNC: 32.8 % — SIGNIFICANT CHANGE UP (ref 32–36)
MCV RBC AUTO: 94.4 FL — SIGNIFICANT CHANGE UP (ref 80–100)
NRBC # FLD: 0 K/UL — LOW (ref 25–125)
PHOSPHATE SERPL-MCNC: 3.8 MG/DL — SIGNIFICANT CHANGE UP (ref 2.5–4.5)
PLATELET # BLD AUTO: 242 K/UL — SIGNIFICANT CHANGE UP (ref 150–400)
PMV BLD: 10.6 FL — SIGNIFICANT CHANGE UP (ref 7–13)
POTASSIUM SERPL-MCNC: 3.9 MMOL/L — SIGNIFICANT CHANGE UP (ref 3.5–5.3)
POTASSIUM SERPL-SCNC: 3.9 MMOL/L — SIGNIFICANT CHANGE UP (ref 3.5–5.3)
PROCALCITONIN SERPL-MCNC: 0.09 NG/ML — SIGNIFICANT CHANGE UP (ref 0.02–0.1)
RBC # BLD: 3.78 M/UL — LOW (ref 3.8–5.2)
RBC # FLD: 13.5 % — SIGNIFICANT CHANGE UP (ref 10.3–14.5)
SODIUM SERPL-SCNC: 142 MMOL/L — SIGNIFICANT CHANGE UP (ref 135–145)
WBC # BLD: 8.75 K/UL — SIGNIFICANT CHANGE UP (ref 3.8–10.5)
WBC # FLD AUTO: 8.75 K/UL — SIGNIFICANT CHANGE UP (ref 3.8–10.5)

## 2019-02-01 PROCEDURE — 99233 SBSQ HOSP IP/OBS HIGH 50: CPT

## 2019-02-01 RX ORDER — POLYETHYLENE GLYCOL 3350 17 G/17G
17 POWDER, FOR SOLUTION ORAL
Qty: 0 | Refills: 0 | DISCHARGE
Start: 2019-02-01

## 2019-02-01 RX ORDER — METOPROLOL TARTRATE 50 MG
1 TABLET ORAL
Qty: 30 | Refills: 0
Start: 2019-02-01 | End: 2019-03-02

## 2019-02-01 RX ORDER — ATENOLOL 25 MG/1
1 TABLET ORAL
Qty: 0 | Refills: 0 | COMMUNITY

## 2019-02-01 RX ORDER — LANOLIN ALCOHOL/MO/W.PET/CERES
1 CREAM (GRAM) TOPICAL
Qty: 30 | Refills: 0 | OUTPATIENT
Start: 2019-02-01 | End: 2019-03-02

## 2019-02-01 RX ORDER — DOCUSATE SODIUM 100 MG
1 CAPSULE ORAL
Qty: 0 | Refills: 0 | DISCHARGE
Start: 2019-02-01

## 2019-02-01 RX ORDER — ALBUTEROL 90 UG/1
1 AEROSOL, METERED ORAL
Qty: 0 | Refills: 0 | COMMUNITY

## 2019-02-01 RX ORDER — METOPROLOL TARTRATE 50 MG
1 TABLET ORAL
Qty: 30 | Refills: 0 | OUTPATIENT
Start: 2019-02-01 | End: 2019-03-02

## 2019-02-01 RX ORDER — LANOLIN ALCOHOL/MO/W.PET/CERES
1 CREAM (GRAM) TOPICAL
Qty: 0 | Refills: 0 | COMMUNITY
Start: 2019-02-01

## 2019-02-01 RX ORDER — METOPROLOL TARTRATE 50 MG
1 TABLET ORAL
Qty: 0 | Refills: 0 | COMMUNITY
Start: 2019-02-01

## 2019-02-01 RX ORDER — SIMVASTATIN 20 MG/1
1 TABLET, FILM COATED ORAL
Qty: 0 | Refills: 0 | DISCHARGE
Start: 2019-02-01

## 2019-02-01 RX ORDER — TRIAMTERENE/HYDROCHLOROTHIAZID 75 MG-50MG
1 TABLET ORAL
Qty: 0 | Refills: 0 | COMMUNITY

## 2019-02-01 RX ORDER — SIMVASTATIN 20 MG/1
1 TABLET, FILM COATED ORAL
Qty: 0 | Refills: 0 | COMMUNITY

## 2019-02-01 RX ORDER — ALBUTEROL 90 UG/1
1 AEROSOL, METERED ORAL
Qty: 1 | Refills: 0 | OUTPATIENT
Start: 2019-02-01 | End: 2019-03-02

## 2019-02-01 RX ADMIN — Medication 100 MILLIGRAM(S): at 17:21

## 2019-02-01 RX ADMIN — ENOXAPARIN SODIUM 40 MILLIGRAM(S): 100 INJECTION SUBCUTANEOUS at 13:12

## 2019-02-01 RX ADMIN — POLYETHYLENE GLYCOL 3350 17 GRAM(S): 17 POWDER, FOR SOLUTION ORAL at 03:38

## 2019-02-01 RX ADMIN — Medication 81 MILLIGRAM(S): at 13:12

## 2019-02-01 RX ADMIN — Medication 100 MILLIGRAM(S): at 07:01

## 2019-02-01 RX ADMIN — Medication 40 MILLIGRAM(S): at 07:01

## 2019-02-01 RX ADMIN — Medication 25 MILLIGRAM(S): at 07:01

## 2019-02-01 RX ADMIN — Medication 0.5 MILLIGRAM(S): at 09:57

## 2019-02-01 NOTE — PROGRESS NOTE ADULT - SUBJECTIVE AND OBJECTIVE BOX
PULMONARY PROGRESS NOTE    PRATIBHA MALIN  MRN-[id number]    Patient is a 83y old  Female who presents with a chief complaint of cough/sob (30 Jan 2019 08:24)      HPI:  cough is slightly better, still not feeling great    ROS:   -no N/V    MEDICATIONS  (STANDING):  aspirin enteric coated 81 milliGRAM(s) Oral daily  buDESOnide   0.5 milliGRAM(s) Respule 0.5 milliGRAM(s) Inhalation two times a day  docusate sodium 100 milliGRAM(s) Oral two times a day  enoxaparin Injectable 40 milliGRAM(s) SubCutaneous daily  metoprolol succinate ER 25 milliGRAM(s) Oral daily  polyethylene glycol 3350 17 Gram(s) Oral daily  predniSONE   Tablet 40 milliGRAM(s) Oral daily  senna 2 Tablet(s) Oral at bedtime  simvastatin 20 milliGRAM(s) Oral at bedtime    MEDICATIONS  (PRN):  guaiFENesin    Syrup 100 milliGRAM(s) Oral every 6 hours PRN Cough  melatonin 3 milliGRAM(s) Oral at bedtime PRN Insomnia      EXAM:  Vital Signs Last 24 Hrs  T(C): 36.2 (31 Jan 2019 01:47), Max: 36.3 (30 Jan 2019 22:21)  T(F): 97.1 (31 Jan 2019 01:47), Max: 97.4 (30 Jan 2019 22:21)  HR: 87 (31 Jan 2019 06:35) (78 - 92)  BP: 149/84 (31 Jan 2019 06:35) (123/63 - 149/84)  BP(mean): --  RR: 19 (31 Jan 2019 01:47) (19 - 20)  SpO2: 97% (31 Jan 2019 01:47) (97% - 99%)    GENERAL: The patient is awake and alert in no apparent distress.     LUNGS: bilat exp wheeze    HEART: S1/S2    LABS/IMAGING: reviewed                                   11.4   8.62  )-----------( 187      ( 31 Jan 2019 01:30 )             37.3   01-31    142  |  106  |  18  ----------------------------<  108<H>  4.0   |  23  |  1.03    Ca    9.2      31 Jan 2019 01:30  Mg     2.3     01-29    TPro  7.8  /  Alb  4.1  /  TBili  0.3  /  DBili  x   /  AST  36<H>  /  ALT  16  /  AlkPhos  61  01-29      < from: CT Angio Chest w/ IV Cont (01.30.19 @ 03:12) >    LUNGS AND LARGE AIRWAYS: Patent central airways. No pulmonary nodules,   masses or consolidation  Left lower lobe calcified granuloma.   Centrilobular emphysema most prominent in the upper lobes. Bibasilar   linear atelectasis. Distal mucoid impacted bronchi in the right lower   lobe.    PLEURA: No pleural effusion.    VESSELS: Atherosclerotic changes. No pulmonary embolism. Dilated main   pulmonary artery measuring up to 3.4 cm suggestive of pulmonary arterial   hypertension.    HEART: Heart size is normal. No pericardial effusion.    MEDIASTINUM AND AKBAR: No lymphadenopathy.    CHEST WALL AND LOWER NECK: Within normal limits.    VISUALIZED UPPER ABDOMEN: Small hiatal hernia.    BONES: Status post T5 laminectomy. Mild spinal degenerative changes.    IMPRESSION:     No pulmonary embolism.    < end of copied text >      PROBLEM LIST:  83y Female with HEALTH ISSUES - PROBLEM Dx:  Cough and wheeze  Hyperlipidemia  Hypothyroidism, unspecified type: Hypothyroidism, unspecified type  Essential hypertension: Essential hypertension    RECS:  -cont pulmicort neb bid for possible asthmatic bronchitis from URI, add prednisone daily x 5 days since cough not much improved and still wheezing.  -cont xopenex  -supplemental O2, wean as tolerated  -oob to chair, incentive loli    Daniella Heller MD   [phone]
Cardiovascular Disease Progress Note    Overnight events: No acute events overnight.  sob and wheezing improved. only complaint is constipation. no new cardiac sx  Otherwise review of systems negative    Objective Findings:  T(C): 36.9 (02-01-19 @ 06:53), Max: 37.1 (01-31-19 @ 21:11)  HR: 81 (02-01-19 @ 06:53) (81 - 96)  BP: 154/88 (02-01-19 @ 06:53) (146/64 - 154/88)  RR: 18 (02-01-19 @ 06:53) (18 - 18)  SpO2: 95% (02-01-19 @ 06:53) (95% - 100%)  Wt(kg): --  Daily     Daily       Physical Exam:  Gen: NAD  HEENT: EOMI  CV: RRR, normal S1 + S2, no m/r/g  Lungs: CTAB  Abd: soft, non-tender  Ext: No edema    Telemetry: nsr    Laboratory Data:                        11.7   8.75  )-----------( 242      ( 01 Feb 2019 06:00 )             35.7     02-01    142  |  104  |  19  ----------------------------<  113<H>  3.9   |  23  |  1.01    Ca    9.7      01 Feb 2019 06:00  Phos  3.8     02-01  Mg     2.1     02-01                Inpatient Medications:  MEDICATIONS  (STANDING):  aspirin enteric coated 81 milliGRAM(s) Oral daily  buDESOnide   0.5 milliGRAM(s) Respule 0.5 milliGRAM(s) Inhalation two times a day  docusate sodium 100 milliGRAM(s) Oral two times a day  enoxaparin Injectable 40 milliGRAM(s) SubCutaneous daily  metoprolol succinate ER 25 milliGRAM(s) Oral daily  polyethylene glycol 3350 17 Gram(s) Oral daily  predniSONE   Tablet 40 milliGRAM(s) Oral daily  senna 2 Tablet(s) Oral at bedtime  simvastatin 20 milliGRAM(s) Oral at bedtime      Assessment:  -emphysema  -wheezing, shortness of breath  -distal mucoid impacted bronchi in the right lower lobe on ct chest  -dilated PA suggestive of PAH    Recs:  -appears euvolemic. BNP mildly elevated. cont to hold diuretics at this time.   -fu pulmonary/dr hunter recs. c/w BD and steroids  -some findings suggestive of PAH on CT. likely 2/2 emphysema and body habitus. TTE with only mild concentric LVH, normal RV size and function  -previously on atenolol at home. c/w beta selective beta blocker to minimize bronchospasm (toprol xl 25mg)  -dispo planning      Over 25 minutes spent on total encounter; more than 50% of the visit was spent counseling and/or coordinating care by the attending physician.      Christiano Abarca MD   Cardiovascular Disease  [phone]
Cardiovascular Disease Progress Note    Overnight events: No acute events overnight.  still wheezing and coughing. no sob at rest. no new cardiac sx  Otherwise review of systems negative    Objective Findings:  T(C): 36.2 (01-31-19 @ 01:47), Max: 36.6 (01-30-19 @ 12:34)  HR: 87 (01-31-19 @ 06:35) (78 - 110)  BP: 149/84 (01-31-19 @ 06:35) (123/63 - 149/84)  RR: 19 (01-31-19 @ 01:47) (19 - 20)  SpO2: 97% (01-31-19 @ 01:47) (97% - 99%)  Wt(kg): --  Daily     Daily       Physical Exam:  Gen: NAD  HEENT: EOMI  CV: RRR, normal S1 + S2, no m/r/g  Lungs: exp wheeze  Abd: soft, non-tender  Ext: No edema        Laboratory Data:                        11.4   8.62  )-----------( 187      ( 31 Jan 2019 01:30 )             37.3     01-31    142  |  106  |  18  ----------------------------<  108<H>  4.0   |  23  |  1.03    Ca    9.2      31 Jan 2019 01:30  Mg     2.3     01-29    TPro  7.8  /  Alb  4.1  /  TBili  0.3  /  DBili  x   /  AST  36<H>  /  ALT  16  /  AlkPhos  61  01-29      CARDIAC MARKERS ( 29 Jan 2019 17:43 )  x     / x     / 165 u/L / 2.28 ng/mL / x              Inpatient Medications:  MEDICATIONS  (STANDING):  aspirin enteric coated 81 milliGRAM(s) Oral daily  buDESOnide   0.5 milliGRAM(s) Respule 0.5 milliGRAM(s) Inhalation two times a day  docusate sodium 100 milliGRAM(s) Oral two times a day  metoprolol succinate ER 25 milliGRAM(s) Oral daily  polyethylene glycol 3350 17 Gram(s) Oral daily  senna 2 Tablet(s) Oral at bedtime  simvastatin 20 milliGRAM(s) Oral at bedtime      Assessment:  -emphysema  -wheezing, shortness of breath  -distal mucoid impacted bronchi in the right lower lobe on ct chest  -dilated PA suggestive of PAH    Recs:  -appears euvolemic. BNP mildly elevated. cont to hold diuretics at this time  -fu pulmonary/dr hunter recs. consider trial of bronchodilators   -consider steroid taper as still actively wheezing  -would check procalcitonin  -some findings suggestive of PAH on CT. likely 2/2 emphysema and body habitus. TTE with only mild concentric LVH, normal RV size and function  -on atenolol at home. c/w beta selective beta blocker to minimize bronchospasm (toprol xl 25mg)          Over 25 minutes spent on total encounter; more than 50% of the visit was spent counseling and/or coordinating care by the attending physician.      Christiano Abarca MD   Cardiovascular Disease  [phone]
PULMONARY PROGRESS NOTE    PRATIBHA MALIN  MRN-[id number]    Patient is a 83y old  Female who presents with a chief complaint of cough/sob (30 Jan 2019 08:24)      HPI:  -reports severe cough x 3 weeks, got so bad that she called EMS bc was also having difficulty breathing.  Cough productive of sputum.  No fever/chills.   Was seen in our office [facility] 3 at that time most symptoms had resolved, PFT essentially normal.    ROS:   -no N/V    ACTIVE MEDICATION LIST:  MEDICATIONS  (STANDING):  aspirin enteric coated 81 milliGRAM(s) Oral daily  levalbuterol Inhalation 0.63 milliGRAM(s) Inhalation every 8 hours  simvastatin 20 milliGRAM(s) Oral at bedtime    MEDICATIONS  (PRN):  guaiFENesin    Syrup 100 milliGRAM(s) Oral every 6 hours PRN Cough      EXAM:  Vital Signs Last 24 Hrs  T(C): 36.6 (30 Jan 2019 12:34), Max: 36.9 (29 Jan 2019 21:35)  T(F): 97.8 (30 Jan 2019 12:34), Max: 98.5 (29 Jan 2019 21:35)  HR: 86 (30 Jan 2019 12:34) (86 - 116)  BP: 143/67 (30 Jan 2019 12:34) (133/90 - 165/71)  BP(mean): --  RR: 20 (30 Jan 2019 12:34) (18 - 22)  SpO2: 99% (30 Jan 2019 12:34) (96% - 99%)    GENERAL: The patient is awake and alert in no apparent distress.     LUNGS: Faint exp wheeze bilat    HEART: S1/S2    LABS/IMAGING: reviewed                        12.6   10.12 )-----------( 253      ( 29 Jan 2019 17:43 )             38.7   01-29    141  |  101  |  20  ----------------------------<  151<H>  5.2   |  22  |  1.06    Ca    10.1      29 Jan 2019 17:43  Mg     2.3     01-29    TPro  7.8  /  Alb  4.1  /  TBili  0.3  /  DBili  x   /  AST  36<H>  /  ALT  16  /  AlkPhos  61  01-29    < from: CT Angio Chest w/ IV Cont (01.30.19 @ 03:12) >    LUNGS AND LARGE AIRWAYS: Patent central airways. No pulmonary nodules,   masses or consolidation  Left lower lobe calcified granuloma.   Centrilobular emphysema most prominent in the upper lobes. Bibasilar   linear atelectasis. Distal mucoid impacted bronchi in the right lower   lobe.    PLEURA: No pleural effusion.    VESSELS: Atherosclerotic changes. No pulmonary embolism. Dilated main   pulmonary artery measuring up to 3.4 cm suggestive of pulmonary arterial   hypertension.    HEART: Heart size is normal. No pericardial effusion.    MEDIASTINUM AND AKBAR: No lymphadenopathy.    CHEST WALL AND LOWER NECK: Within normal limits.    VISUALIZED UPPER ABDOMEN: Small hiatal hernia.    BONES: Status post T5 laminectomy. Mild spinal degenerative changes.    IMPRESSION:     No pulmonary embolism.    < end of copied text >      PROBLEM LIST:  83y Female with HEALTH ISSUES - PROBLEM Dx:  Cough and wheeze  Hyperlipidemia  Hypothyroidism, unspecified type: Hypothyroidism, unspecified type  Essential hypertension: Essential hypertension    RECS:  -would add pulmicort neb bid for possible asthmatic bronchitis from URI  -if not improved would add oral steroid  -cont xopenex  -supplemental O2, wean as tolerated  -oob to chair, incentive loli    Daniella Heller MD   [phone]
PULMONARY PROGRESS NOTE    PRATIBHA MALIN  MRN-[id number]    Patient is a 83y old  Female who presents with a chief complaint of cough/sob (30 Jan 2019 08:24)      HPI:  feeling better, walked around, cough improved.    ROS:   -no N/V    MEDICATIONS  (STANDING):  aspirin enteric coated 81 milliGRAM(s) Oral daily  buDESOnide   0.5 milliGRAM(s) Respule 0.5 milliGRAM(s) Inhalation two times a day  docusate sodium 100 milliGRAM(s) Oral two times a day  enoxaparin Injectable 40 milliGRAM(s) SubCutaneous daily  metoprolol succinate ER 25 milliGRAM(s) Oral daily  polyethylene glycol 3350 17 Gram(s) Oral daily  predniSONE   Tablet 40 milliGRAM(s) Oral daily  senna 2 Tablet(s) Oral at bedtime  simvastatin 20 milliGRAM(s) Oral at bedtime    MEDICATIONS  (PRN):  guaiFENesin    Syrup 100 milliGRAM(s) Oral every 6 hours PRN Cough  melatonin 3 milliGRAM(s) Oral at bedtime PRN Insomnia        EXAM:  Vital Signs Last 24 Hrs  T(C): 36.7 (01 Feb 2019 14:28), Max: 37.1 (31 Jan 2019 21:11)  T(F): 98 (01 Feb 2019 14:28), Max: 98.7 (31 Jan 2019 21:11)  HR: 87 (01 Feb 2019 14:28) (81 - 102)  BP: 151/67 (01 Feb 2019 14:28) (148/83 - 154/88)  BP(mean): --  RR: 19 (01 Feb 2019 14:28) (18 - 19)  SpO2: 98% (01 Feb 2019 14:28) (95% - 98%)    GENERAL: The patient is awake and alert in no apparent distress.     LUNGS: clear    HEART: S1/S2    LABS/IMAGING: reviewed                                              11.7   8.75  )-----------( 242      ( 01 Feb 2019 06:00 )             35.7   02-01    142  |  104  |  19  ----------------------------<  113<H>  3.9   |  23  |  1.01    Ca    9.7      01 Feb 2019 06:00  Phos  3.8     02-01  Mg     2.1     02-01        < from: CT Angio Chest w/ IV Cont (01.30.19 @ 03:12) >    LUNGS AND LARGE AIRWAYS: Patent central airways. No pulmonary nodules,   masses or consolidation  Left lower lobe calcified granuloma.   Centrilobular emphysema most prominent in the upper lobes. Bibasilar   linear atelectasis. Distal mucoid impacted bronchi in the right lower   lobe.    PLEURA: No pleural effusion.    VESSELS: Atherosclerotic changes. No pulmonary embolism. Dilated main   pulmonary artery measuring up to 3.4 cm suggestive of pulmonary arterial   hypertension.    HEART: Heart size is normal. No pericardial effusion.    MEDIASTINUM AND AKBAR: No lymphadenopathy.    CHEST WALL AND LOWER NECK: Within normal limits.    VISUALIZED UPPER ABDOMEN: Small hiatal hernia.    BONES: Status post T5 laminectomy. Mild spinal degenerative changes.    IMPRESSION:     No pulmonary embolism.    < end of copied text >      PROBLEM LIST:  83y Female with HEALTH ISSUES - PROBLEM Dx:  Cough and wheeze  Hyperlipidemia  Hypothyroidism, unspecified type: Hypothyroidism, unspecified type  Essential hypertension: Essential hypertension    RECS:  - complete prednisone daily x 5 days   -cont xopenex PRN  -no pulmonary objection to discharge, follow up with  in 1-2 weeks    Daniella Heller MD   [phone]

## 2019-02-01 NOTE — DISCHARGE NOTE ADULT - PATIENT PORTAL LINK FT
You can access the [facility] Patient Portal, offered by [facility], by registering with the following website: [email or web address]

## 2019-02-01 NOTE — DISCHARGE NOTE ADULT - ADDITIONAL INSTRUCTIONS
Your diuretic was held by cardiology as you were euvolemic (normal fluid balance). Please follow up with your outpatient cardiologist regarding when to resume your diuretics.    Follow up with your pulmonologist within 1 week of discharge from the hospital Your diuretic was held by cardiology as you were euvolemic (normal fluid balance). Please follow up with your outpatient cardiologist regarding when to resume your diuretics.    Follow up with your pulmonologist () within 1 week of discharge from the hospital

## 2019-02-01 NOTE — DISCHARGE NOTE ADULT - CARE PROVIDER_API CALL
Daniella Heller)  Critical Care Medicine; Internal Medicine; Pulmonary Disease  [address], [address]  [place] [address]  Phone: [phone]  Fax: [phone]    Christiano Abarca)  Internal Medicine  [address] [place] [address]  Phone: [phone]  Fax: [phone]    David Abarca)  Cardiovascular Disease; Internal Medicine  [address] [place] [address]  Phone: [phone]  Fax: [phone]

## 2019-02-01 NOTE — DISCHARGE NOTE ADULT - CARE PLAN
Principal Discharge DX:	SOB (shortness of breath)  Assessment and plan of treatment:	SOB likely COPD exacerbation stemming from URI      PE ruled out        TTE showing chronic diastolic heart faiure  pulm predicated  continue medical mgmt and optimization   wean off O2 as able  OB as able  Secondary Diagnosis:	Essential hypertension  Assessment and plan of treatment:	cardio follow up and medical optimizing  monitor vitals  Secondary Diagnosis:	Hypothyroidism, unspecified type  Assessment and plan of treatment:	Continue your thyroid medications as recommended and follow-up with your outpatient provider for continual thyroid function testing and further medication management.  Secondary Diagnosis:	Hyperlipidemia, unspecified hyperlipidemia type  Assessment and plan of treatment:	Continue cholesterol control medications. Continue DASH diet. Follow up with your PCP within 1 week of discharge for further management and monitoring of lipid and cholesterol panels. Principal Discharge DX:	SOB (shortness of breath)  Assessment and plan of treatment:	You presented to the emergency department with shortness of breath. Your shortness of breath was likely a COPD exacerbation stemming from and upper respiratory infection. A CT angiogram was obtained which ruled out pulmonary embolism. A transthoracic Echocardiogram was obtained which showed chronic diastolic heart failure. Pulmonary was consulted and you were started on prednisone. Please continue prednisone for 3 more days. Follow up with your pulmonologist within one week of discharge from the hospital. Please continue to use your inhalers as prescribed and follow-up with your primary care provider/pulmonologist for further care/recommendations. It is recommended to undergo annual pulmonary function testings. Monitor for signs/symptoms of COPD exacerbation, such as, shortness of breath, increased sputum production, increased cough, wheezing, difficulty breathing, or fever - Report to the emergency room if symptoms are not relieved by usual regimen.  Secondary Diagnosis:	Essential hypertension  Assessment and plan of treatment:	Cardiology was consulted during your hospitalization. Your diuretic was held by cardiology as you were euvolemic (normal fluid balance). Your Atenolol was stopped and Metoprolol was started ot help with bronchospasms. Please follow up with your outpatient cardiologist regarding when to resume your diuretics. Continue blood pressure medication regimen as directed. Monitor for any visual changes, headaches or dizziness.  Monitor blood pressure regularly.  Follow up with your PCP for further management for high blood pressure.  Secondary Diagnosis:	Hypothyroidism, unspecified type  Assessment and plan of treatment:	Continue your thyroid medications as recommended and follow-up with your outpatient provider for continual thyroid function testing and further medication management.  Secondary Diagnosis:	Hyperlipidemia, unspecified hyperlipidemia type  Assessment and plan of treatment:	Continue cholesterol control medications. Continue DASH diet. Follow up with your PCP within 1 week of discharge for further management and monitoring of lipid and cholesterol panels. Principal Discharge DX:	SOB (shortness of breath)  Goal:	Resolution and return to baseline  Assessment and plan of treatment:	You presented to the emergency department with shortness of breath. Your shortness of breath was likely a COPD exacerbation stemming from and upper respiratory infection. A CT angiogram was obtained which ruled out pulmonary embolism. A transthoracic Echocardiogram was obtained which showed chronic diastolic heart failure. Pulmonary was consulted and you were started on prednisone. Please continue prednisone for 3 more days. Follow up with your pulmonologist within one week of discharge from the hospital. Please continue to use your inhalers as prescribed and follow-up with your primary care provider/pulmonologist for further care/recommendations. It is recommended to undergo annual pulmonary function testings. Monitor for signs/symptoms of COPD exacerbation, such as, shortness of breath, increased sputum production, increased cough, wheezing, difficulty breathing, or fever - Report to the emergency room if symptoms are not relieved by usual regimen.  Secondary Diagnosis:	Essential hypertension  Assessment and plan of treatment:	Cardiology was consulted during your hospitalization. Your diuretic was held by cardiology as you were euvolemic (normal fluid balance). Your Atenolol was stopped and Metoprolol was started ot help with bronchospasms. Please follow up with your outpatient cardiologist regarding when to resume your diuretics. Continue blood pressure medication regimen as directed. Monitor for any visual changes, headaches or dizziness.  Monitor blood pressure regularly.  Follow up with your PCP for further management for high blood pressure.  Secondary Diagnosis:	Hypothyroidism, unspecified type  Assessment and plan of treatment:	Continue your thyroid medications as recommended and follow-up with your outpatient provider for continual thyroid function testing and further medication management.  Secondary Diagnosis:	Hyperlipidemia, unspecified hyperlipidemia type  Assessment and plan of treatment:	Continue cholesterol control medications. Continue DASH diet. Follow up with your PCP within 1 week of discharge for further management and monitoring of lipid and cholesterol panels.

## 2019-02-01 NOTE — PROVIDER CONTACT NOTE (OTHER) - RECOMMENDATIONS
Will come to talk with the patient Will come to talk with the patient but is in an emergency now and will come when she can

## 2019-02-01 NOTE — PROVIDER CONTACT NOTE (OTHER) - SITUATION
Pt with hx of hemeroids, taking Miralax and milk of magnesia for constipation is c/o of rectal pressure and constipation. Pt refuses a suppository due to the hemeroids and is getting very frustrated. Pt taking Miralax and milk of magnesia for constipation is c/o of rectal pressure and constipation. Pt refusing a suppository and is getting very frustrated.

## 2019-02-01 NOTE — DISCHARGE NOTE ADULT - HOME CARE AGENCY
[facility]-[phone]. This agency will send a nurse to your home to evaluate your care for Home Physical Therapy.

## 2019-02-01 NOTE — DISCHARGE NOTE ADULT - CARE PROVIDERS DIRECT ADDRESSES
[email or web address],DirectAddress_Unknown

## 2019-02-01 NOTE — DISCHARGE NOTE ADULT - HOSPITAL COURSE
82 yo f with sob/wheezing mainly provoked by productive cough over last several weeks       Problem/Plan - 1:  ·  Problem: SOB likely COPD exacerbation stemming from URI      PE ruled out        TTE showing chronic diastolic heart faiure  pulm predicated  continue medical mgmt and optimization   wean off O2 as able  OB as able      Problem/Plan - 2:  ·  Problem: Essential hypertension  cardio follow up and medical optimizing  monitor vitals     Problem/Plan - 3:  ·  Problem: Hypothyroidism  synthroid  tsh WNL     Problem/Plan - 4:  ·  Problem: Hyperlipidemia  statin 82 yo f with sob/wheezing mainly provoked by productive cough over last several weeks      SOB likely COPD exacerbation stemming from URI  PE ruled out   TTE showing chronic diastolic heart faiure  pulm predicated  continue medical mgmt and optimization   - finish 3 more days of prednisone as outpatient     Essential hypertension  cardio follow up and medical optimizing  monitor vitals  - atenolol stopped  - continue metoprolol to help with bronchospasms  - your diuretics were stopped as euvolemic     Hypothyroidism  synthroid  tsh WNL     Hyperlipidemia  statin    As per medical attending patient is medically stable for discharge to home with home PT  Dispo: home w/ home PT 82 yo f with sob/wheezing mainly provoked by productive cough over last several weeks      SOB likely COPD exacerbation stemming from URI  PE ruled out   TTE showing chronic diastolic heart faiure  pulm predicated  continue medical mgmt and optimization   - finish 3 more days of prednisone as outpatient     Essential hypertension  cardio follow up and medical optimizing  monitor vitals  - atenolol stopped  - continue metoprolol to help with bronchospasms  - your diuretics were stopped as euvolemic     Hypothyroidism  synthroid  tsh WNL     Hyperlipidemia  statin    Your diuretic was held by cardiology as you were euvolemic (normal fluid balance). Please follow up with your outpatient cardiologist regarding when to resume your diuretics.    Follow up with your pulmonologist within 1 week of discharge from the hospital    As per medical attending patient is medically stable for discharge to home with home PT  Dispo: home w/ home PT 84 yo f with sob/wheezing mainly provoked by productive cough over last several weeks      SOB likely COPD exacerbation stemming from URI  PE ruled out   TTE showing chronic diastolic heart faiure  pulm predicated  continue medical mgmt and optimization   - finish 3 more days of prednisone as outpatient     Essential hypertension  cardio follow up and medical optimizing  monitor vitals  - atenolol stopped  - continue metoprolol to help with bronchospasms  - your diuretics were stopped as euvolemic     Hypothyroidism  synthroid  tsh WNL     Hyperlipidemia  statin    Your diuretic was held by cardiology as you were euvolemic (normal fluid balance). Please follow up with your outpatient cardiologist regarding when to resume your diuretics.    Follow up with your pulmonologist () within 1 week of discharge from the hospital    Discussed plan of care and outpatient medications with medical attending prior to discharging patient from the hospital.   As per medical attending patient is medically stable for discharge to home with home PT  Dispo: home w/ home PT

## 2019-02-01 NOTE — DISCHARGE NOTE ADULT - SECONDARY DIAGNOSIS.
Essential hypertension Hypothyroidism, unspecified type Hyperlipidemia, unspecified hyperlipidemia type

## 2019-02-01 NOTE — DISCHARGE NOTE ADULT - MEDICATION SUMMARY - MEDICATIONS TO TAKE
I will START or STAY ON the medications listed below when I get home from the hospital:    predniSONE 20 mg oral tablet  -- 2 tab(s) by mouth once a day  -- Indication: For COPD exacerbation    Aspirin Enteric Coated 81 mg oral delayed release tablet  -- 1 tab(s) by mouth once a day  -- Indication: For Preventative    oxycodone-acetaminophen 5 mg-325 mg oral tablet  -- 1 tab(s) by mouth every 6-8 hours, As Needed    ISTOP Reference#[id number]  Quantity:100  Day Supply: 25  Rx Dispensed: 12/24/2018  -- Indication: For Prn pain    gabapentin 300 mg oral capsule  -- 1 cap(s) by mouth once a day, As needed (per pharmacy, last dispensed 11/8/2018 for a quantity#90. patient reports taking her "pain medication" only as needed)   -- Indication: For neve pain    Paxil 30 mg oral tablet  -- 1 tab(s) by mouth once a day (per pharmacy, last dispensed on 10/8/2018 for a quantity#90, unclear if patient is still taking this medication as she is an unreliable source of information at this time)  -- Indication: For antidepressant    simvastatin 20 mg oral tablet  -- 1 tab(s) by mouth once a day (at bedtime)  -- Indication: For Hyperlipidemia, unspecified hyperlipidemia type    metoprolol succinate 25 mg oral tablet, extended release  -- 1 tab(s) by mouth once a day  -- Indication: For HTN (hypertension)    ProAir HFA 90 mcg/inh inhalation aerosol  -- 1 puff(s) inhaled every 8 hours, As Needed -for shortness of breath and/or wheezing   -- For inhalation only.  It is very important that you take or use this exactly as directed.  Do not skip doses or discontinue unless directed by your doctor.  Obtain medical advice before taking any non-prescription drugs as some may affect the action of this medication.  Shake well before use.    -- Indication: For COPD    Proctozone HC 2.5% rectal cream with applicator  -- AS NEEDED  -- Indication: For Rectal cream    guaiFENesin 100 mg/5 mL oral liquid  -- 5 milliliter(s) by mouth every 6 hours, As needed, Cough  -- Indication: For Prn Cough    Linzess 145 mcg oral capsule  -- 1 cap(s) by mouth once a day  -- Indication: For GI agent    docusate sodium 100 mg oral capsule  -- 1 cap(s) by mouth 2 times a day  -- Indication: For Constipation    polyethylene glycol 3350 oral powder for reconstitution  -- 17 gram(s) by mouth once a day  -- Indication: For Constipation    melatonin 3 mg oral tablet  -- 1 tab(s) by mouth once a day (at bedtime), As needed, Insomnia  -- Indication: For Sleep aide    levothyroxine 150 mcg (0.15 mg) oral tablet  -- Per patient- 0.5 tab(s) by mouth once a day on Monday and Thursday and 1 tab(s) by mouth once a day on Tuesday, Wednesday, Friday, Saturday, and Sunday    (However, per pharmacy prescribed as 0.5 tab(s) by mouth once a day Monday through Thursday and 1 tab(s) by mouth Friday through Sunday)   -- Indication: For Hypothyroidism, unspecified type    multivitamin  -- 1 tab(s) by mouth once a day  -- Indication: For Supplementation    Vitamin D3  -- 1 tab(s) by mouth once a day  -- Indication: For Supplementation I will START or STAY ON the medications listed below when I get home from the hospital:    predniSONE 20 mg oral tablet  -- 2 tab(s) by mouth once a day  -- Indication: For COPD exacerbation    Aspirin Enteric Coated 81 mg oral delayed release tablet  -- 1 tab(s) by mouth once a day  -- Indication: For Preventative    oxycodone-acetaminophen 5 mg-325 mg oral tablet  -- 1 tab(s) by mouth every 6-8 hours, As Needed    ISTOP Reference#[id number]  Quantity:100  Day Supply: 25  Rx Dispensed: 12/24/2018  -- Indication: For Prn pain    gabapentin 300 mg oral capsule  -- 1 cap(s) by mouth once a day, As needed (per pharmacy, last dispensed 11/8/2018 for a quantity#90. patient reports taking her "pain medication" only as needed)   -- Indication: For neve pain    Paxil 30 mg oral tablet  -- 1 tab(s) by mouth once a day (per pharmacy, last dispensed on 10/8/2018 for a quantity#90, unclear if patient is still taking this medication as she is an unreliable source of information at this time)  -- Indication: For antidepressant    simvastatin 20 mg oral tablet  -- 1 tab(s) by mouth once a day (at bedtime)  -- Indication: For Hyperlipidemia, unspecified hyperlipidemia type    metoprolol succinate 25 mg oral tablet, extended release  -- 1 tab(s) by mouth once a day  -- Indication: For HTN (hypertension)    ProAir HFA 90 mcg/inh inhalation aerosol  -- 1 puff(s) inhaled every 8 hours, As Needed -for shortness of breath and/or wheezing   -- For inhalation only.  It is very important that you take or use this exactly as directed.  Do not skip doses or discontinue unless directed by your doctor.  Obtain medical advice before taking any non-prescription drugs as some may affect the action of this medication.  Shake well before use.    -- Indication: For COPD    Proctozone HC 2.5% rectal cream with applicator  -- AS NEEDED  -- Indication: For Rectal cream    guaiFENesin 100 mg/5 mL oral liquid  -- 5 milliliter(s) by mouth every 6 hours, As needed, Cough  -- Indication: For Prn Cough    Linzess 145 mcg oral capsule  -- 1 cap(s) by mouth once a day  -- Indication: For GI agent    docusate sodium 100 mg oral capsule  -- 1 cap(s) by mouth 2 times a day  -- Indication: For Constipation    polyethylene glycol 3350 oral powder for reconstitution  -- 17 gram(s) by mouth once a day  -- Indication: For Constipation    melatonin 3 mg oral tablet  -- 1 tab(s) by mouth once a day (at bedtime), As needed, Insomnia  -- Indication: For Sleep aide    levothyroxine 150 mcg (0.15 mg) oral tablet  -- Per patient- 0.5 tab(s) by mouth once a day on Monday and Thursday and 1 tab(s) by mouth once a day on Tuesday, Wednesday, Friday, Saturday, and Sunday    (However, per pharmacy prescribed as 0.5 tab(s) by mouth once a day Monday through Thursday and 1 tab(s) by mouth Friday through Sunday)   -- Indication: For Hypothyroidism, unspecified type    multivitamin  -- 1 tab(s) by mouth once a day  -- Indication: For Supplementation    Vitamin D3  -- 1 tab(s) by mouth once a day  -- Indication: For Supplementation

## 2019-02-01 NOTE — DISCHARGE NOTE ADULT - MEDICATION SUMMARY - MEDICATIONS TO STOP TAKING
I will STOP taking the medications listed below when I get home from the hospital:    atenolol 50 mg oral tablet  -- 1 tab(s) by mouth once a day    triamterene-hydrochlorothiazide 37.5 mg- 25 mg oral capsule  -- 1 cap(s) by mouth 3 times a week    (Per pharmacy last dispensed on 11/30/2018 for a quantity# 39, however, patient poor historian and unable to recall this medication)

## 2019-02-01 NOTE — PROGRESS NOTE ADULT - ASSESSMENT
_________________________________________________________________________________________  ========>>  M E D I C A L   A T T E N D I N G    F O L L O W  U P  N O T E  <<=========  -----------------------------------------------------------------------------------------------------    - Patient seen and examined by me earlier today.   - In summary,  PRATIBHA MALIN is a 83y year old woman who originally presented with  SOB  - Patient today overall doing ok, comfortable, eating OK.  breathing better , ambulating     ==================>> REVIEW OF SYSTEM <<=================    GEN: no fever, no chills, no pain  RESP: SOB improved , no cough, no sputum  CVS: no chest pain, no palpitations, no edema  GI: no abdominal pain, no nausea, no constipation, no diarrhea  : no dysuria, no frequency, no hematuria  Neuro: no headache, no dizziness  Derm : no itching, no rash    ==================>> PHYSICAL EXAM <<=================    GEN: A&O X 3 , NAD , comfortable in bed  HEENT: NCAT, PERRL, MMM, hearing intact  Neck: supple , no JVD  CVS: S1S2 , regular , No M/R/G appreciated  PULM: mild scattered rhonchi, minimal wheezing   ABD.: soft. non tender, non distended,  bowel sounds present, obese   Extrem: intact pulses , no edema   PSYCH : normal mood,  not anxious       ==================>> MEDICATIONS <<====================    aspirin enteric coated 81 milliGRAM(s) Oral daily  buDESOnide   0.5 milliGRAM(s) Respule 0.5 milliGRAM(s) Inhalation two times a day  docusate sodium 100 milliGRAM(s) Oral two times a day  enoxaparin Injectable 40 milliGRAM(s) SubCutaneous daily  metoprolol succinate ER 25 milliGRAM(s) Oral daily  polyethylene glycol 3350 17 Gram(s) Oral daily  predniSONE   Tablet 40 milliGRAM(s) Oral daily  senna 2 Tablet(s) Oral at bedtime  simvastatin 20 milliGRAM(s) Oral at bedtime    MEDICATIONS  (PRN):  guaiFENesin    Syrup 100 milliGRAM(s) Oral every 6 hours PRN Cough  melatonin 3 milliGRAM(s) Oral at bedtime PRN Insomnia    ==================>> VITAL SIGNS <<==================    Vital Signs Last 24 Hrs  T(C): 36.7 (02-01-19 @ 14:28)  T(F): 98 (02-01-19 @ 14:28), Max: 98.7 (01-31-19 @ 21:11)  HR: 87 (02-01-19 @ 14:28) (81 - 102)  BP: 151/67 (02-01-19 @ 14:28)  RR: 19 (02-01-19 @ 14:28) (18 - 19)  SpO2: 98% (02-01-19 @ 14:28) (95% - 98%)       ==================>> LAB AND IMAGING <<==================                        11.7   8.75  )-----------( 242      ( 01 Feb 2019 06:00 )             35.7        142  |  104  |  19  ----------------------------<  113<H>  3.9   |  23  |  1.01    Ca    9.7      01 Feb 2019 06:00  Phos  3.8     02-01  Mg     2.1     02-01    WBC count:   8.75 <<== ,  8.62 <<== ,  10.12 <<==   Hemoglobin:   11.7 <<==,  11.4 <<==,  12.6 <<==  platelets:  242 <==, 187 <==, 253 <==    Creatinine:  1.01  <<==, 1.03  <<==, 1.06  <<==  Sodium:   142  <==, 142  <==, 141  <==    < from: CT Angio Chest w/ IV Cont (01.30.19 @ 03:12) >  LUNGS AND LARGE AIRWAYS: Patent central airways. No pulmonary nodules,   masses or consolidation  Left lower lobe calcified granuloma.   Centrilobular emphysema most prominent in the upper lobes. Bibasilar   linear atelectasis. Distal mucoid impacted bronchi in the right lower lobe.  < end of copied text >      < from: Transthoracic Echocardiogram (01.30.19 @ 16:59) >  CONCLUSIONS:  1. Increased relative wall thickness with normal left  ventricular mass index, consistent with concentric left  ventricular remodeling.  2. Normal left ventricular systolic function. No segmental  wall motion abnormalities.  3. Normal right ventricular size and function.  < end of copied text >  ___________________________________________________________________________________  ===============>>  A S S E S S M E N T   A N D   P L A N <<===============  ------------------------------------------------------------------------------------------    · Assessment		  82 yo f with sob/wheezing mainly provoked by productive cough over last several weeks      Problem/Plan - 1:  ·  Problem: SOB likely COPD exacerbation stemming from URI      PE ruled out        TTE showing chronic diastolic heart failure  cardio / pulm appreciated   continue medical mgmt and optimization   pt off O2   OOB as able     Problem/Plan - 2:  ·  Problem: Essential hypertension  cardio follow up and medical optimizing  monitor vitals    Problem/Plan - 3:  ·  Problem: Hypothyroidism  synthroid  tsh WNL    Problem/Plan - 4:  ·  Problem: Hyperlipidemia  statin    -GI/DVT Prophylaxis.  - PT / OOB   DC planing home  follow up as OP with cardio / PMD and pulm   --------------------------------------------  Case discussed with pt and family, AZALEA deras  Education given on findings and plan of care  ___________________________  HBess Carpio D.O.  Pager: [phone]

## 2019-02-01 NOTE — DISCHARGE NOTE ADULT - PLAN OF CARE
SOB likely COPD exacerbation stemming from URI      PE ruled out        TTE showing chronic diastolic heart faiure  pulm predicated  continue medical mgmt and optimization   wean off O2 as able  OB as able cardio follow up and medical optimizing  monitor vitals Continue your thyroid medications as recommended and follow-up with your outpatient provider for continual thyroid function testing and further medication management. Continue cholesterol control medications. Continue DASH diet. Follow up with your PCP within 1 week of discharge for further management and monitoring of lipid and cholesterol panels. You presented to the emergency department with shortness of breath. Your shortness of breath was likely a COPD exacerbation stemming from and upper respiratory infection. A CT angiogram was obtained which ruled out pulmonary embolism. A transthoracic Echocardiogram was obtained which showed chronic diastolic heart failure. Pulmonary was consulted and you were started on prednisone. Please continue prednisone for 3 more days. Follow up with your pulmonologist within one week of discharge from the hospital. Please continue to use your inhalers as prescribed and follow-up with your primary care provider/pulmonologist for further care/recommendations. It is recommended to undergo annual pulmonary function testings. Monitor for signs/symptoms of COPD exacerbation, such as, shortness of breath, increased sputum production, increased cough, wheezing, difficulty breathing, or fever - Report to the emergency room if symptoms are not relieved by usual regimen. Cardiology was consulted during your hospitalization. Your diuretic was held by cardiology as you were euvolemic (normal fluid balance). Your Atenolol was stopped and Metoprolol was started ot help with bronchospasms. Please follow up with your outpatient cardiologist regarding when to resume your diuretics. Continue blood pressure medication regimen as directed. Monitor for any visual changes, headaches or dizziness.  Monitor blood pressure regularly.  Follow up with your PCP for further management for high blood pressure. Resolution and return to baseline

## 2019-02-08 PROBLEM — I10 ESSENTIAL (PRIMARY) HYPERTENSION: Chronic | Status: ACTIVE | Noted: 2019-01-29

## 2019-02-08 PROBLEM — E78.5 HYPERLIPIDEMIA, UNSPECIFIED: Chronic | Status: ACTIVE | Noted: 2019-01-30

## 2019-02-25 ENCOUNTER — APPOINTMENT (OUTPATIENT)
Dept: PULMONOLOGY | Facility: CLINIC | Age: 84
End: 2019-02-25
Payer: MEDICARE

## 2019-02-25 VITALS — HEART RATE: 67 BPM | SYSTOLIC BLOOD PRESSURE: 134 MMHG | OXYGEN SATURATION: 95 % | DIASTOLIC BLOOD PRESSURE: 81 MMHG

## 2019-02-25 DIAGNOSIS — J44.9 CHRONIC OBSTRUCTIVE PULMONARY DISEASE, UNSPECIFIED: ICD-10-CM

## 2019-02-25 PROCEDURE — 99214 OFFICE O/P EST MOD 30 MIN: CPT | Mod: 25

## 2019-02-25 PROCEDURE — 94060 EVALUATION OF WHEEZING: CPT

## 2019-02-25 RX ORDER — FLUTICASONE PROPIONATE 50 UG/1
50 SPRAY, METERED NASAL
Qty: 1 | Refills: 5 | Status: ACTIVE | COMMUNITY
Start: 2019-02-25 | End: 1900-01-01

## 2019-02-25 RX ORDER — ALBUTEROL SULFATE 90 UG/1
108 (90 BASE) AEROSOL, METERED RESPIRATORY (INHALATION)
Qty: 1 | Refills: 1 | Status: ACTIVE | COMMUNITY
Start: 2019-02-25 | End: 1900-01-01

## 2019-02-26 NOTE — PHYSICAL EXAM
[General Appearance - Well Developed] : well developed [General Appearance - Well Nourished] : well nourished [Normal Conjunctiva] : the conjunctiva exhibited no abnormalities [Normal Oropharynx] : normal oropharynx [Jugular Venous Distention Increased] : there was no jugular-venous distention [Heart Sounds] : normal S1 and S2 [Murmurs] : no murmurs present [Auscultation Breath Sounds / Voice Sounds] : lungs were clear to auscultation bilaterally [Lungs Percussion] : the lungs were normal to percussion [Abdomen Soft] : soft [] : no hepato-splenomegaly [Nail Clubbing] : no clubbing of the fingernails [Cyanosis, Localized] : no localized cyanosis [FreeTextEntry1] : Overweight

## 2019-02-26 NOTE — HISTORY OF PRESENT ILLNESS
[FreeTextEntry1] : 3 weeks ago,dc after 5 days in hospital and discharged with antibiotics and then needed another round of Augmentin and steroids, finished 2 weeks ago\par ? touch of pneumonia.\par Admitted for cough and SOB\par Had CTA negative.\par \par Only c/o is nasal congestion

## 2019-02-26 NOTE — PROCEDURE
[FreeTextEntry1] : Pulmonary function testing.\par FEV1, FVC, and FEV1/FVC are within normal limits. There was not a significant response to inhaled bronchodilator.

## 2019-02-26 NOTE — CONSULT LETTER
[Dear  ___] : Dear ~KYLE, [Consult Letter:] : I had the pleasure of evaluating your patient, [unfilled]. [Please see my note below.] : Please see my note below. [Sincerely,] : Sincerely, [FreeTextEntry2] : David Abarca MD\par  [FreeTextEntry3] : Jey Reinoso MD FCCP\par

## 2019-03-25 ENCOUNTER — APPOINTMENT (OUTPATIENT)
Dept: PULMONOLOGY | Facility: CLINIC | Age: 84
End: 2019-03-25

## 2019-05-14 NOTE — ED ADULT NURSE NOTE - CHIEF COMPLAINT QUOTE
Pt brought in by EMS from home complaining of SOB and wheezing and cough x few days. Pt denies chest pain, N/V/D, fever or chills. 1.78

## 2019-06-03 ENCOUNTER — APPOINTMENT (OUTPATIENT)
Dept: PULMONOLOGY | Facility: CLINIC | Age: 84
End: 2019-06-03

## 2019-10-02 ENCOUNTER — OTHER (OUTPATIENT)
Age: 84
End: 2019-10-02

## 2019-10-02 DIAGNOSIS — R91.1 SOLITARY PULMONARY NODULE: ICD-10-CM

## 2020-01-10 ENCOUNTER — OTHER (OUTPATIENT)
Age: 85
End: 2020-01-10

## 2021-01-19 ENCOUNTER — INPATIENT (INPATIENT)
Facility: HOSPITAL | Age: 86
LOS: 3 days | Discharge: HOME | DRG: 179 | End: 2021-01-23
Attending: INTERNAL MEDICINE | Admitting: INTERNAL MEDICINE
Payer: MEDICARE

## 2021-01-19 VITALS
DIASTOLIC BLOOD PRESSURE: 75 MMHG | HEART RATE: 90 BPM | TEMPERATURE: 99 F | OXYGEN SATURATION: 98 % | HEIGHT: 60 IN | SYSTOLIC BLOOD PRESSURE: 116 MMHG | RESPIRATION RATE: 18 BRPM | WEIGHT: 197.09 LBS

## 2021-01-19 DIAGNOSIS — R50.9 FEVER, UNSPECIFIED: ICD-10-CM

## 2021-01-19 LAB
ALBUMIN SERPL ELPH-MCNC: 4 G/DL — SIGNIFICANT CHANGE UP (ref 3.3–5)
ALP SERPL-CCNC: 56 U/L — SIGNIFICANT CHANGE UP (ref 40–120)
ALT FLD-CCNC: 17 U/L — SIGNIFICANT CHANGE UP (ref 10–45)
ANION GAP SERPL CALC-SCNC: 13 MMOL/L — SIGNIFICANT CHANGE UP (ref 5–17)
APPEARANCE UR: CLEAR — SIGNIFICANT CHANGE UP
APTT BLD: 26.3 SEC — LOW (ref 27.5–35.5)
AST SERPL-CCNC: 26 U/L — SIGNIFICANT CHANGE UP (ref 10–40)
BACTERIA # UR AUTO: NEGATIVE — SIGNIFICANT CHANGE UP
BASOPHILS # BLD AUTO: 0.02 K/UL — SIGNIFICANT CHANGE UP (ref 0–0.2)
BASOPHILS NFR BLD AUTO: 0.4 % — SIGNIFICANT CHANGE UP (ref 0–2)
BILIRUB SERPL-MCNC: 0.2 MG/DL — SIGNIFICANT CHANGE UP (ref 0.2–1.2)
BILIRUB UR-MCNC: NEGATIVE — SIGNIFICANT CHANGE UP
BUN SERPL-MCNC: 25 MG/DL — HIGH (ref 7–23)
CALCIUM SERPL-MCNC: 9.7 MG/DL — SIGNIFICANT CHANGE UP (ref 8.4–10.5)
CHLORIDE SERPL-SCNC: 103 MMOL/L — SIGNIFICANT CHANGE UP (ref 96–108)
CO2 SERPL-SCNC: 22 MMOL/L — SIGNIFICANT CHANGE UP (ref 22–31)
COLOR SPEC: SIGNIFICANT CHANGE UP
CREAT SERPL-MCNC: 1.25 MG/DL — SIGNIFICANT CHANGE UP (ref 0.5–1.3)
DIFF PNL FLD: NEGATIVE — SIGNIFICANT CHANGE UP
EOSINOPHIL # BLD AUTO: 0.09 K/UL — SIGNIFICANT CHANGE UP (ref 0–0.5)
EOSINOPHIL NFR BLD AUTO: 1.6 % — SIGNIFICANT CHANGE UP (ref 0–6)
EPI CELLS # UR: 2 — SIGNIFICANT CHANGE UP
GLUCOSE SERPL-MCNC: 112 MG/DL — HIGH (ref 70–99)
GLUCOSE UR QL: NEGATIVE — SIGNIFICANT CHANGE UP
HCT VFR BLD CALC: 33.6 % — LOW (ref 34.5–45)
HGB BLD-MCNC: 11.2 G/DL — LOW (ref 11.5–15.5)
HYALINE CASTS # UR AUTO: 5 /LPF — HIGH (ref 0–7)
IMM GRANULOCYTES NFR BLD AUTO: 0.5 % — SIGNIFICANT CHANGE UP (ref 0–1.5)
INR BLD: 1.05 RATIO — SIGNIFICANT CHANGE UP (ref 0.88–1.16)
KETONES UR-MCNC: NEGATIVE — SIGNIFICANT CHANGE UP
LEUKOCYTE ESTERASE UR-ACNC: ABNORMAL
LYMPHOCYTES # BLD AUTO: 1.21 K/UL — SIGNIFICANT CHANGE UP (ref 1–3.3)
LYMPHOCYTES # BLD AUTO: 22 % — SIGNIFICANT CHANGE UP (ref 13–44)
MCHC RBC-ENTMCNC: 30.9 PG — SIGNIFICANT CHANGE UP (ref 27–34)
MCHC RBC-ENTMCNC: 33.3 GM/DL — SIGNIFICANT CHANGE UP (ref 32–36)
MCV RBC AUTO: 92.6 FL — SIGNIFICANT CHANGE UP (ref 80–100)
MONOCYTES # BLD AUTO: 0.93 K/UL — HIGH (ref 0–0.9)
MONOCYTES NFR BLD AUTO: 16.9 % — HIGH (ref 2–14)
NEUTROPHILS # BLD AUTO: 3.23 K/UL — SIGNIFICANT CHANGE UP (ref 1.8–7.4)
NEUTROPHILS NFR BLD AUTO: 58.6 % — SIGNIFICANT CHANGE UP (ref 43–77)
NITRITE UR-MCNC: NEGATIVE — SIGNIFICANT CHANGE UP
NRBC # BLD: 0 /100 WBCS — SIGNIFICANT CHANGE UP (ref 0–0)
PH UR: 6 — SIGNIFICANT CHANGE UP (ref 5–8)
PLATELET # BLD AUTO: 247 K/UL — SIGNIFICANT CHANGE UP (ref 150–400)
POTASSIUM SERPL-MCNC: 3.9 MMOL/L — SIGNIFICANT CHANGE UP (ref 3.5–5.3)
POTASSIUM SERPL-SCNC: 3.9 MMOL/L — SIGNIFICANT CHANGE UP (ref 3.5–5.3)
PROT SERPL-MCNC: 6.9 G/DL — SIGNIFICANT CHANGE UP (ref 6–8.3)
PROT UR-MCNC: ABNORMAL
PROTHROM AB SERPL-ACNC: 12.6 SEC — SIGNIFICANT CHANGE UP (ref 10.6–13.6)
RBC # BLD: 3.63 M/UL — LOW (ref 3.8–5.2)
RBC # FLD: 13.7 % — SIGNIFICANT CHANGE UP (ref 10.3–14.5)
RBC CASTS # UR COMP ASSIST: 1 /HPF — SIGNIFICANT CHANGE UP (ref 0–4)
SARS-COV-2 RNA SPEC QL NAA+PROBE: DETECTED
SODIUM SERPL-SCNC: 138 MMOL/L — SIGNIFICANT CHANGE UP (ref 135–145)
SP GR SPEC: >1.05 (ref 1.01–1.02)
UROBILINOGEN FLD QL: NEGATIVE — SIGNIFICANT CHANGE UP
WBC # BLD: 5.51 K/UL — SIGNIFICANT CHANGE UP (ref 3.8–10.5)
WBC # FLD AUTO: 5.51 K/UL — SIGNIFICANT CHANGE UP (ref 3.8–10.5)
WBC UR QL: 22 /HPF — HIGH (ref 0–5)

## 2021-01-19 PROCEDURE — 71045 X-RAY EXAM CHEST 1 VIEW: CPT | Mod: 26

## 2021-01-19 PROCEDURE — 71250 CT THORAX DX C-: CPT | Mod: 26,MA

## 2021-01-19 PROCEDURE — 74177 CT ABD & PELVIS W/CONTRAST: CPT | Mod: 26,MA

## 2021-01-19 PROCEDURE — 99285 EMERGENCY DEPT VISIT HI MDM: CPT

## 2021-01-19 PROCEDURE — 93010 ELECTROCARDIOGRAM REPORT: CPT

## 2021-01-19 PROCEDURE — 99223 1ST HOSP IP/OBS HIGH 75: CPT | Mod: CS

## 2021-01-19 RX ORDER — ACETAMINOPHEN 500 MG
975 TABLET ORAL ONCE
Refills: 0 | Status: COMPLETED | OUTPATIENT
Start: 2021-01-19 | End: 2021-01-19

## 2021-01-19 RX ORDER — MORPHINE SULFATE 50 MG/1
2 CAPSULE, EXTENDED RELEASE ORAL ONCE
Refills: 0 | Status: DISCONTINUED | OUTPATIENT
Start: 2021-01-19 | End: 2021-01-19

## 2021-01-19 RX ORDER — AZITHROMYCIN 500 MG/1
500 TABLET, FILM COATED ORAL ONCE
Refills: 0 | Status: COMPLETED | OUTPATIENT
Start: 2021-01-19 | End: 2021-01-19

## 2021-01-19 RX ORDER — MORPHINE SULFATE 50 MG/1
4 CAPSULE, EXTENDED RELEASE ORAL ONCE
Refills: 0 | Status: DISCONTINUED | OUTPATIENT
Start: 2021-01-19 | End: 2021-01-19

## 2021-01-19 RX ORDER — CEFTRIAXONE 500 MG/1
1000 INJECTION, POWDER, FOR SOLUTION INTRAMUSCULAR; INTRAVENOUS ONCE
Refills: 0 | Status: COMPLETED | OUTPATIENT
Start: 2021-01-19 | End: 2021-01-19

## 2021-01-19 RX ADMIN — MORPHINE SULFATE 2 MILLIGRAM(S): 50 CAPSULE, EXTENDED RELEASE ORAL at 16:21

## 2021-01-19 RX ADMIN — AZITHROMYCIN 250 MILLIGRAM(S): 500 TABLET, FILM COATED ORAL at 19:35

## 2021-01-19 RX ADMIN — MORPHINE SULFATE 2 MILLIGRAM(S): 50 CAPSULE, EXTENDED RELEASE ORAL at 22:31

## 2021-01-19 RX ADMIN — CEFTRIAXONE 100 MILLIGRAM(S): 500 INJECTION, POWDER, FOR SOLUTION INTRAMUSCULAR; INTRAVENOUS at 18:50

## 2021-01-19 RX ADMIN — Medication 975 MILLIGRAM(S): at 16:21

## 2021-01-19 NOTE — ED PROVIDER NOTE - CLINICAL SUMMARY MEDICAL DECISION MAKING FREE TEXT BOX
Dr. Burns Note: elderly female with fever to 100F at home, LUQ ab pain, ab ct, ekg, labs, ua, consider covid/viral vs intra-abdominal infection vs pneumonia vs uti

## 2021-01-19 NOTE — H&P ADULT - HISTORY OF PRESENT ILLNESS
NIGHT HOSPITALIST:   Patient UNKNOWN to me previously, assigned to me at this point via the ER and by Dr. Wu to admit this 86 y/o F--followed by her office physician above--patient with a history of reported IBS, essential HTN (patient does not know her BP medications), thyroid disorder (patient does not know her Rx), with the patient apparently with one day of fever, dry coughing paroxysms, with intermittent LUQ pain.  NO N/V.  NO diarrhea, no obstipation.  Patient's spouse apparently was diagnosed with SARS-CoV2 and admitted at [facility], with patient's brother in law (they reside in the same house also unruly COVID-19.

## 2021-01-19 NOTE — ED PROVIDER NOTE - CHILD ABUSE FACILITY
Kolton Alonso is a 44 year old  male  presenting with a itching rash on left lower leg and spreading to right lower leg since last 2 days and worsening; rash is blistering and weeping;  Rash is itching;  No fever,chills;  Denies using any new clothes, detergents, soaps, cosmetics; denies eating any sea food or shell fish or any new medications; does not know which allergen she is exposed to.  he has no other symptoms.  ROS:  denies any chest pain, sob, palpitations or any other cardiovascular symptoms;  denies any respiratory distress or symptoms;  denies any gastrointestinal symptoms;  denies any genitourinary symptoms;  denies any headaches, visual changes or any other neurologic symptoms;             denies;, fever, chills    ALLERGIES: no known allergies.    Pmhx: none     Social History     Social History   • Marital status:      Spouse name: N/A   • Number of children: N/A   • Years of education: N/A     Occupational History   • Not on file.     Social History Main Topics   • Smoking status: Never Smoker   • Smokeless tobacco: None    • Alcohol use None    • Drug use: None    • Sexual activity: None          PHYSICAL EXAM:    GENERAL APPEARANCE:  Healthy, alert, in no distress, healthy,alert,in no distress    HEAD: Normocephalic.  Atraumatic. No masses, lesions, tenderness or abnormalities noted  EYES:  PERRLA, EOMI, normal conjunctiva  EARS:  TM's clear bilaterally, NL light reflex, normal external canals.  NOSE:  Nares normal. Septum midline. Mucosa normal.  No erythema.  No drainage.  THROAT: mmm, no tonsillar hypertrophy, no erythema, exudates or petechiae noted  NECK: supple, no lymphadenopathy  LUNGS:  Chest symmetric with normal A/P diameter.  Lungs are clear to auscultation.  There is good aeration.  There are no wheezes, rales or rhonchi noted.  HEART:  S1,S2, regular rate and rhythm no murmur,S3, or S4 auscultated  SKIN:  Red rash on the left popliteal fossa around 14x 7cms with redness and some  fine papulovesicular patchy lesions and some spreading down the lower leg;  Small similar patches are seen on right lower leg anterior;    Assessment:    (L30.9) Dermatitis  (primary encounter diagnosis)  (T78.40XA) Allergic reaction, initial encounter  Plan:  Medication as per below orders.           >if symptoms are not improved in 1-2 days or if worsen should recheck immediately either with pmd/wic  >discussed the sideeffects of steroids with the patient.  >In case of worsening of symptoms like increased swelling of the throat with breathing difficulties, wheezing or generalized body swelling or worsening rash, should seek medical attention immediately.                     [facility]

## 2021-01-19 NOTE — ED ADULT NURSE REASSESSMENT NOTE - NS ED NURSE REASSESS COMMENT FT1
Pt returned from CT to [place]. Pt repositioned to crawley's position at this time, pt denies needs, comfortable in stretcher. NAD.

## 2021-01-19 NOTE — ED PROVIDER NOTE - PROGRESS NOTE DETAILS
pt agitated, unhappy with stretcher, states she has chronic back pain. Pt unable to ambulate will give morphine, pt trying to go out of bed, placed on one to one. -Yuridia Larry PA-C

## 2021-01-19 NOTE — ED ADULT NURSE NOTE - CHPI ED NUR SYMPTOMS NEG
no abdominal pain/no cough/no decreased eating/drinking/no diarrhea/no fever/no headache/no rash/no vomiting

## 2021-01-19 NOTE — H&P ADULT - NSHPLABSRESULTS_GEN_ALL_CORE
WBC 5.5    Hgb 11.2    Platelets of 247K    normal differential.    INR 1.0    K+ 3.9    Random glucose of 112  Cr 1.2 (chronic kidney disease stage 3)  AST 26  ALT 17    UA with moderate leukocyte esterase  WBC 22.    EKG tracing reviewed with NSR at 97 with nonspecific ST changes.    Chest radiograph reviewed with RIGHT lower lobe infiltrate.    COVID-19 PCR>>POSITIVE.    CTT chest no contrast with no focal consolidation, nonspecific pulmonary nodules, mild centrilobular emphysema, atherosclerosis, punctate CA++ RIGHT breast soft tissue, prior laminectomy midthoracic spine

## 2021-01-19 NOTE — ED ADULT NURSE REASSESSMENT NOTE - NS ED NURSE REASSESS COMMENT FT1
Pt returned to stretcher with RN. Pt requested to get onto stomach in stretcher for comfort. Pt to CT at this time. Plan of care explained to patient. Pt returned to stretcher with RN. Pt requested to get onto stomach in stretcher for comfort. Pt to CT at this time. Plan of care explained to patient. Pt calm at this time, resting comfortably in stretcher, no complaints at this time.

## 2021-01-19 NOTE — H&P ADULT - PROBLEM SELECTOR PLAN 1
See above.   Will continue with IV Rocephin for presumed cystitis for now.  At present, no oxygen requirements, with mild TYLER.  Inflammatory indices ordered.  Would consider formal ID opinion in the AM.

## 2021-01-19 NOTE — ED ADULT NURSE REASSESSMENT NOTE - NS ED NURSE REASSESS COMMENT FT1
Pt screaming in stretcher, states "I am going to get out of this bed myself, I will not use a bedpan." Pt educated on fall risk, uses walker at home, pt is Aox4, fall socks applied at this time. Pt verbally aggressive to RN, using profanities. Pt asked to sit back in stretcher while RN gets wheelchair to prevent a fall. Pt verbalized intent to get out of bed, pt scooting to end of stretcher. 2nd RN at bedside with wheelchair at this time. Pt assisted into wheelchair by 2 RNs , wheel- chaired to bathroom, assisted to toilet from wheel chair by RN, educated by RN to use rails and to use call bell when finished.

## 2021-01-19 NOTE — H&P ADULT - PROBLEM SELECTOR PLAN 3
See above.  Would clarify patient's medications in the AM.  IVF for now. See above.  Would clarify patient's medications in the AM.  Will repeat Cr in the AM.

## 2021-01-19 NOTE — H&P ADULT - NSHPSOURCEINFOTX_GEN_ALL_CORE
Patient does not recall her medications.  [facility] closed at this hour

## 2021-01-19 NOTE — ED ADULT NURSE NOTE - NSIMPLEMENTINTERV_GEN_ALL_ED
Implemented All Fall Risk Interventions:  [place] to call system. Call bell, personal items and telephone within reach. Instruct patient to call for assistance. Room bathroom lighting operational. Non-slip footwear when patient is off stretcher. Physically safe environment: no spills, clutter or unnecessary equipment. Stretcher in lowest position, wheels locked, appropriate side rails in place. Provide visual cue, wrist band, yellow gown, etc. Monitor gait and stability. Monitor for mental status changes and reorient to person, place, and time. Review medications for side effects contributing to fall risk. Reinforce activity limits and safety measures with patient and family.

## 2021-01-19 NOTE — H&P ADULT - NSHPPHYSICALEXAM_GEN_ALL_CORE
Physical exam with an elderly, nontoxic, chronically ill appearing F. Physical exam with an elderly, nontoxic, chronically ill appearing F.    Afebrile.    HR  83   RR 14.   BP  122//74   95% on RA.    HEENT< PERRL< EOMI  Neck supple  NO thyromegaly  Chest grossly clear  Cor s1 s2  Abdomen soft nontender, normal bowel sounds, no rebound.  Skin dry  Ext poor nail hygiene.  NO ulcers.  Neurologic AXOx3.  Speech fluent.  cognition intact.  UE/LE 5/5.  NO SI/HI. Physical exam with an elderly, nontoxic, chronically ill appearing F.    Afebrile.    HR  83   RR 14.   BP  122//74   95% on RA.    HEENT< PERRL< EOMI  Neck supple  NO thyromegaly  Chest grossly clear  Cor s1 s2  Refused breast exam.  Abdomen soft nontender, normal bowel sounds, no rebound.  Skin dry  Ext poor nail hygiene.  NO ulcers.  Neurologic AXOx3.  Speech fluent.  cognition intact.  UE/LE 5/5.  NO SI/HI.

## 2021-01-19 NOTE — ED ADULT NURSE REASSESSMENT NOTE - NS ED NURSE REASSESS COMMENT FT1
Pt loud, irritable and verbally abusive towards staff. At times able to be redirected. Pt updated on plan of care. Safety and suppport provided.

## 2021-01-19 NOTE — ED ADULT NURSE NOTE - OBJECTIVE STATEMENT
86 yo F aaox4 C/o of fever body aches. PMH IBS. PT reports exposure to her  who recently tested positive for COVID-19.   Pt reports LUQ pain and worsening body aches onset 1 day ago. IV line placed labs drawn and sent.

## 2021-01-19 NOTE — H&P ADULT - NSHPSOCIALHISTORY_GEN_ALL_CORE
Quit tobacco 19 years ago 1 pack/day since twenties.  NO ethanol.    Spouse just admitted to [facility] with SARS-CoV2.

## 2021-01-19 NOTE — H&P ADULT - ASSESSMENT
NIGHT HOSPITALIST:    Presentation of patient with COVID-19 infection in the setting of patient with no present oxygen requirements in the setting of patient with essential HTN< reported thyroid disorder (patient does not know her medications) with mild acute kidney injury and presumed cystitis.  Received IV antibiotics for presumed bacterial pneumonia but CTT chest does not corroborate this diagnosis.  Will continue with IV Rocephin for now for the presumed cystitis with symptoms but at present, patient does not require supplemental oxygenation and Decadron is currently not indicated in the setting of current Emergency Use Authorization by the FDA.       Patient also as acute kidney injury (unclear if from the patient's BP medications--currently unknown) and will obtain inflammatory indices to determine the status of patient's COVID-19 infection, with no current requirements for oxygenation to support Emergency Use Authorization at present for remdesivir.   Will defer to the PRIMARY PROVIDER in the AM to review with consideration with ID opinion in this regard.    Unclear to the incidental nonspecific punctate calcification in the RIGHT breast>>would contact patient's PCP, Dr. Abarca, of recent outpatient mammograms performed. NIGHT HOSPITALIST:    Presentation of patient with COVID-19 infection in the setting of patient with no present oxygen requirements in the setting of patient with essential HTN< reported thyroid disorder (patient does not know her medications) with mild acute kidney injury and presumed cystitis.  Received IV antibiotics for presumed bacterial pneumonia but CTT chest does not corroborate this diagnosis.  Will continue with IV Rocephin for now for the presumed cystitis with symptoms but at present, patient does not require supplemental oxygenation and Decadron is currently not indicated in the setting of current Emergency Use Authorization by the FDA.       CTT abdomen nondiagnostic for LUQ pain.    Patient also as acute kidney injury (unclear if from the patient's BP medications--currently unknown) and will obtain inflammatory indices to determine the status of patient's COVID-19 infection, with no current requirements for oxygenation to support Emergency Use Authorization at present for remdesivir.   Will defer to the PRIMARY PROVIDER in the AM to review with consideration with ID opinion in this regard.    Unclear to the incidental nonspecific punctate calcification in the RIGHT breast>>would contact patient's PCP, Dr. Abarca, of recent outpatient mammograms performed.  Breast US.

## 2021-01-19 NOTE — ED ADULT NURSE REASSESSMENT NOTE - NS ED NURSE REASSESS COMMENT FT1
Patient screaming from stretcher in hallway, demanding "I want to speak to someone with authority to tell them everything that happened here today", "I will get out of the stretcher, I don't care if I fall. I will crawl if I have to". 2 RN's present at bedside listening to patient, updating patient awaiting CT. Patient loud, tearful and expressive. Patient c/o back pain. Call made to charge RN to come speak with patient.

## 2021-01-19 NOTE — H&P ADULT - PROBLEM SELECTOR PLAN 8
Transitions of Care Status:  1.  Name of PCP:    David Abarca MD  [phone] (PCP-Cardiology)  2.  PCP Contacted on Admission: [ ] Y    [x ] N    3.  PCP contacted at Discharge: [ ] Y    [ ] N    [ ] N/A  4.  Post-Discharge Appointment Date and Location:  5.  Summary of Handoff given to PCP:

## 2021-01-19 NOTE — H&P ADULT - NSHPREVIEWOFSYSTEMS_GEN_ALL_CORE
NO HA, no focal weakness.  NO chest pain/pressure.  NO palpitations.  NO red blood per rectum or melena.  NO hematemesis.  No back pain, no tearing back pain.  Occasional dysuria.  NO hematuria.    NO rash.  No anosmia or dysgeusia.  No joint pain.  NO breast symptoms.  NO vaginal bleeding.  No thyroid symptoms.  NO SI/HI.

## 2021-01-19 NOTE — ED ADULT NURSE REASSESSMENT NOTE - NS ED NURSE REASSESS COMMENT FT1
Pt able to be redirected no longer needing 1:1. Lying comfortably in stretcher no acute distress noted. Food and beverage provided. Pt updated on plan of care.

## 2021-01-19 NOTE — ED ADULT NURSE NOTE - IN THE PAST 12 MONTHS HAVE YOU USED DRUGS OTHER THAN THOSE REQUIRED FOR MEDICAL REASON?
How Severe Are Your Bumps?: moderate
Have Your Bumps Been Treated?: not been treated
Is This A New Presentation, Or A Follow-Up?: Bumps
No

## 2021-01-19 NOTE — ED PROVIDER NOTE - ATTENDING CONTRIBUTION TO CARE
Pt with fever to 100F, LUQ ab pain, malaise;  with covid.  Exam: well appearing, nontoxic, clear lungs, ab soft, nontender, no rash.

## 2021-01-19 NOTE — ED PROVIDER NOTE - OBJECTIVE STATEMENT
84 yo female with pmh IBS presenting with ultiple complaints. HERe primarily as she is concerned for COVID exposure as her  is currently in the hospital for COVID tx and she had elevated temperature 100 last night. Also c/o LUQ abd pain that Is squeezing in nature, waxing and waning starting 1 day ago at rest and improved when she has a BM and taking oxycodone. This pain is different than usual IBS pain, denies diarrhea, nausea, vomiting or difficulty tolerating PO. No shortness of breath, difficulty breathing, chest discomfort.

## 2021-01-19 NOTE — H&P ADULT - ATTENDING COMMENTS
NIGHT HOSPITALIST:   Patient aware of course and agrees with plan/care as above.   Given patient's comorbidities, patient's long term prognosis is guarded.    Emotional support provided to patient.  Care reviewed with covering NP/PA for endorsement to Dr. Wu.    Caden Olmstead MD  [phone] NIGHT HOSPITALIST:   Patient aware of course and agrees with plan/care as above.   Given patient's comorbidities, patient's long term prognosis is guarded.    Emotional support provided to patient.  Care reviewed with covering NP/PA, Delia,  for endorsement to Dr. Wu.    Caden Olmstead MD  [phone]

## 2021-01-20 ENCOUNTER — TRANSCRIPTION ENCOUNTER (OUTPATIENT)
Age: 86
End: 2021-01-20

## 2021-01-20 DIAGNOSIS — R92.1 MAMMOGRAPHIC CALCIFICATION FOUND ON DIAGNOSTIC IMAGING OF BREAST: ICD-10-CM

## 2021-01-20 DIAGNOSIS — I10 ESSENTIAL (PRIMARY) HYPERTENSION: ICD-10-CM

## 2021-01-20 DIAGNOSIS — E07.9 DISORDER OF THYROID, UNSPECIFIED: ICD-10-CM

## 2021-01-20 DIAGNOSIS — Z29.9 ENCOUNTER FOR PROPHYLACTIC MEASURES, UNSPECIFIED: ICD-10-CM

## 2021-01-20 DIAGNOSIS — U07.1 COVID-19: ICD-10-CM

## 2021-01-20 DIAGNOSIS — R10.12 LEFT UPPER QUADRANT PAIN: ICD-10-CM

## 2021-01-20 DIAGNOSIS — Z02.9 ENCOUNTER FOR ADMINISTRATIVE EXAMINATIONS, UNSPECIFIED: ICD-10-CM

## 2021-01-20 DIAGNOSIS — N17.9 ACUTE KIDNEY FAILURE, UNSPECIFIED: ICD-10-CM

## 2021-01-20 DIAGNOSIS — M53.9 DORSOPATHY, UNSPECIFIED: Chronic | ICD-10-CM

## 2021-01-20 LAB
ALBUMIN SERPL ELPH-MCNC: 3.9 G/DL — SIGNIFICANT CHANGE UP (ref 3.3–5)
ALP SERPL-CCNC: 57 U/L — SIGNIFICANT CHANGE UP (ref 40–120)
ALT FLD-CCNC: 20 U/L — SIGNIFICANT CHANGE UP (ref 10–45)
ANION GAP SERPL CALC-SCNC: 16 MMOL/L — SIGNIFICANT CHANGE UP (ref 5–17)
AST SERPL-CCNC: 33 U/L — SIGNIFICANT CHANGE UP (ref 10–40)
BASOPHILS # BLD AUTO: 0.02 K/UL — SIGNIFICANT CHANGE UP (ref 0–0.2)
BASOPHILS NFR BLD AUTO: 0.3 % — SIGNIFICANT CHANGE UP (ref 0–2)
BILIRUB SERPL-MCNC: 0.2 MG/DL — SIGNIFICANT CHANGE UP (ref 0.2–1.2)
BUN SERPL-MCNC: 25 MG/DL — HIGH (ref 7–23)
CALCIUM SERPL-MCNC: 9.4 MG/DL — SIGNIFICANT CHANGE UP (ref 8.4–10.5)
CHLORIDE SERPL-SCNC: 99 MMOL/L — SIGNIFICANT CHANGE UP (ref 96–108)
CK SERPL-CCNC: 178 U/L — HIGH (ref 25–170)
CO2 SERPL-SCNC: 22 MMOL/L — SIGNIFICANT CHANGE UP (ref 22–31)
CREAT SERPL-MCNC: 1.32 MG/DL — HIGH (ref 0.5–1.3)
CRP SERPL-MCNC: 0.43 MG/DL — HIGH (ref 0–0.4)
CULTURE RESULTS: SIGNIFICANT CHANGE UP
D DIMER BLD IA.RAPID-MCNC: 505 NG/ML DDU — HIGH
EOSINOPHIL # BLD AUTO: 0.12 K/UL — SIGNIFICANT CHANGE UP (ref 0–0.5)
EOSINOPHIL NFR BLD AUTO: 1.8 % — SIGNIFICANT CHANGE UP (ref 0–6)
FERRITIN SERPL-MCNC: 159 NG/ML — HIGH (ref 15–150)
GLUCOSE SERPL-MCNC: 100 MG/DL — HIGH (ref 70–99)
HCT VFR BLD CALC: 36.6 % — SIGNIFICANT CHANGE UP (ref 34.5–45)
HGB BLD-MCNC: 12 G/DL — SIGNIFICANT CHANGE UP (ref 11.5–15.5)
IMM GRANULOCYTES NFR BLD AUTO: 0.3 % — SIGNIFICANT CHANGE UP (ref 0–1.5)
LDH SERPL L TO P-CCNC: 179 U/L — SIGNIFICANT CHANGE UP (ref 50–242)
LYMPHOCYTES # BLD AUTO: 1.32 K/UL — SIGNIFICANT CHANGE UP (ref 1–3.3)
LYMPHOCYTES # BLD AUTO: 19.4 % — SIGNIFICANT CHANGE UP (ref 13–44)
MCHC RBC-ENTMCNC: 31.3 PG — SIGNIFICANT CHANGE UP (ref 27–34)
MCHC RBC-ENTMCNC: 32.8 GM/DL — SIGNIFICANT CHANGE UP (ref 32–36)
MCV RBC AUTO: 95.6 FL — SIGNIFICANT CHANGE UP (ref 80–100)
MONOCYTES # BLD AUTO: 0.88 K/UL — SIGNIFICANT CHANGE UP (ref 0–0.9)
MONOCYTES NFR BLD AUTO: 12.9 % — SIGNIFICANT CHANGE UP (ref 2–14)
NEUTROPHILS # BLD AUTO: 4.44 K/UL — SIGNIFICANT CHANGE UP (ref 1.8–7.4)
NEUTROPHILS NFR BLD AUTO: 65.3 % — SIGNIFICANT CHANGE UP (ref 43–77)
NRBC # BLD: 0 /100 WBCS — SIGNIFICANT CHANGE UP (ref 0–0)
PLATELET # BLD AUTO: 232 K/UL — SIGNIFICANT CHANGE UP (ref 150–400)
POTASSIUM SERPL-MCNC: 4 MMOL/L — SIGNIFICANT CHANGE UP (ref 3.5–5.3)
POTASSIUM SERPL-SCNC: 4 MMOL/L — SIGNIFICANT CHANGE UP (ref 3.5–5.3)
PROT SERPL-MCNC: 7.3 G/DL — SIGNIFICANT CHANGE UP (ref 6–8.3)
RBC # BLD: 3.83 M/UL — SIGNIFICANT CHANGE UP (ref 3.8–5.2)
RBC # FLD: 14.1 % — SIGNIFICANT CHANGE UP (ref 10.3–14.5)
SARS-COV-2 IGG SERPL QL IA: NEGATIVE — SIGNIFICANT CHANGE UP
SARS-COV-2 IGM SERPL IA-ACNC: <0.1 INDEX — SIGNIFICANT CHANGE UP
SODIUM SERPL-SCNC: 137 MMOL/L — SIGNIFICANT CHANGE UP (ref 135–145)
SPECIMEN SOURCE: SIGNIFICANT CHANGE UP
TROPONIN T, HIGH SENSITIVITY RESULT: 16 NG/L — SIGNIFICANT CHANGE UP (ref 0–51)
TSH SERPL-MCNC: 1.92 UIU/ML — SIGNIFICANT CHANGE UP (ref 0.27–4.2)
WBC # BLD: 6.8 K/UL — SIGNIFICANT CHANGE UP (ref 3.8–10.5)
WBC # FLD AUTO: 6.8 K/UL — SIGNIFICANT CHANGE UP (ref 3.8–10.5)

## 2021-01-20 RX ORDER — ENOXAPARIN SODIUM 100 MG/ML
40 INJECTION SUBCUTANEOUS EVERY 12 HOURS
Refills: 0 | Status: DISCONTINUED | OUTPATIENT
Start: 2021-01-20 | End: 2021-01-23

## 2021-01-20 RX ORDER — METOPROLOL TARTRATE 50 MG
25 TABLET ORAL DAILY
Refills: 0 | Status: DISCONTINUED | OUTPATIENT
Start: 2021-01-20 | End: 2021-01-23

## 2021-01-20 RX ORDER — LEVOTHYROXINE SODIUM 125 MCG
150 TABLET ORAL DAILY
Refills: 0 | Status: DISCONTINUED | OUTPATIENT
Start: 2021-01-20 | End: 2021-01-23

## 2021-01-20 RX ORDER — ACETAMINOPHEN 500 MG
650 TABLET ORAL ONCE
Refills: 0 | Status: COMPLETED | OUTPATIENT
Start: 2021-01-20 | End: 2021-01-20

## 2021-01-20 RX ORDER — LINACLOTIDE 145 UG/1
290 CAPSULE, GELATIN COATED ORAL
Refills: 0 | Status: DISCONTINUED | OUTPATIENT
Start: 2021-01-20 | End: 2021-01-23

## 2021-01-20 RX ORDER — FAMOTIDINE 10 MG/ML
20 INJECTION INTRAVENOUS DAILY
Refills: 0 | Status: DISCONTINUED | OUTPATIENT
Start: 2021-01-20 | End: 2021-01-23

## 2021-01-20 RX ORDER — ACETAMINOPHEN 500 MG
650 TABLET ORAL EVERY 6 HOURS
Refills: 0 | Status: DISCONTINUED | OUTPATIENT
Start: 2021-01-20 | End: 2021-01-23

## 2021-01-20 RX ORDER — SIMVASTATIN 20 MG/1
20 TABLET, FILM COATED ORAL AT BEDTIME
Refills: 0 | Status: DISCONTINUED | OUTPATIENT
Start: 2021-01-20 | End: 2021-01-23

## 2021-01-20 RX ORDER — CEFTRIAXONE 500 MG/1
1000 INJECTION, POWDER, FOR SOLUTION INTRAMUSCULAR; INTRAVENOUS EVERY 24 HOURS
Refills: 0 | Status: DISCONTINUED | OUTPATIENT
Start: 2021-01-20 | End: 2021-01-21

## 2021-01-20 RX ORDER — ALPRAZOLAM 0.25 MG
0.25 TABLET ORAL ONCE
Refills: 0 | Status: DISCONTINUED | OUTPATIENT
Start: 2021-01-20 | End: 2021-01-20

## 2021-01-20 RX ORDER — INFLUENZA VIRUS VACCINE 15; 15; 15; 15 UG/.5ML; UG/.5ML; UG/.5ML; UG/.5ML
0.5 SUSPENSION INTRAMUSCULAR ONCE
Refills: 0 | Status: DISCONTINUED | OUTPATIENT
Start: 2021-01-20 | End: 2021-01-23

## 2021-01-20 RX ORDER — ALPRAZOLAM 0.25 MG
0.25 TABLET ORAL EVERY 12 HOURS
Refills: 0 | Status: DISCONTINUED | OUTPATIENT
Start: 2021-01-20 | End: 2021-01-21

## 2021-01-20 RX ORDER — BUDESONIDE AND FORMOTEROL FUMARATE DIHYDRATE 160; 4.5 UG/1; UG/1
2 AEROSOL RESPIRATORY (INHALATION)
Refills: 0 | Status: DISCONTINUED | OUTPATIENT
Start: 2021-01-20 | End: 2021-01-23

## 2021-01-20 RX ORDER — OXYCODONE AND ACETAMINOPHEN 5; 325 MG/1; MG/1
1 TABLET ORAL ONCE
Refills: 0 | Status: DISCONTINUED | OUTPATIENT
Start: 2021-01-20 | End: 2021-01-20

## 2021-01-20 RX ORDER — OXYCODONE AND ACETAMINOPHEN 5; 325 MG/1; MG/1
1 TABLET ORAL EVERY 12 HOURS
Refills: 0 | Status: DISCONTINUED | OUTPATIENT
Start: 2021-01-20 | End: 2021-01-23

## 2021-01-20 RX ORDER — LIDOCAINE 4 G/100G
1 CREAM TOPICAL DAILY
Refills: 0 | Status: DISCONTINUED | OUTPATIENT
Start: 2021-01-20 | End: 2021-01-23

## 2021-01-20 RX ADMIN — LINACLOTIDE 290 MICROGRAM(S): 145 CAPSULE, GELATIN COATED ORAL at 09:42

## 2021-01-20 RX ADMIN — Medication 650 MILLIGRAM(S): at 01:53

## 2021-01-20 RX ADMIN — Medication 650 MILLIGRAM(S): at 06:18

## 2021-01-20 RX ADMIN — SIMVASTATIN 20 MILLIGRAM(S): 20 TABLET, FILM COATED ORAL at 22:38

## 2021-01-20 RX ADMIN — ENOXAPARIN SODIUM 40 MILLIGRAM(S): 100 INJECTION SUBCUTANEOUS at 17:41

## 2021-01-20 RX ADMIN — Medication 30 MILLIGRAM(S): at 22:38

## 2021-01-20 RX ADMIN — FAMOTIDINE 20 MILLIGRAM(S): 10 INJECTION INTRAVENOUS at 11:42

## 2021-01-20 RX ADMIN — Medication 650 MILLIGRAM(S): at 11:42

## 2021-01-20 RX ADMIN — ENOXAPARIN SODIUM 40 MILLIGRAM(S): 100 INJECTION SUBCUTANEOUS at 05:52

## 2021-01-20 RX ADMIN — Medication 0.25 MILLIGRAM(S): at 01:53

## 2021-01-20 RX ADMIN — CEFTRIAXONE 100 MILLIGRAM(S): 500 INJECTION, POWDER, FOR SOLUTION INTRAMUSCULAR; INTRAVENOUS at 17:41

## 2021-01-20 RX ADMIN — Medication 0.25 MILLIGRAM(S): at 15:28

## 2021-01-20 RX ADMIN — LIDOCAINE 1 PATCH: 4 CREAM TOPICAL at 19:45

## 2021-01-20 RX ADMIN — OXYCODONE AND ACETAMINOPHEN 1 TABLET(S): 5; 325 TABLET ORAL at 17:41

## 2021-01-20 RX ADMIN — LIDOCAINE 1 PATCH: 4 CREAM TOPICAL at 23:46

## 2021-01-20 RX ADMIN — Medication 650 MILLIGRAM(S): at 22:39

## 2021-01-20 RX ADMIN — OXYCODONE AND ACETAMINOPHEN 1 TABLET(S): 5; 325 TABLET ORAL at 23:21

## 2021-01-20 RX ADMIN — Medication 0.25 MILLIGRAM(S): at 23:36

## 2021-01-20 RX ADMIN — LIDOCAINE 1 PATCH: 4 CREAM TOPICAL at 11:42

## 2021-01-20 NOTE — DISCHARGE NOTE PROVIDER - CARE PROVIDERS DIRECT ADDRESSES
,[email or web address]

## 2021-01-20 NOTE — DISCHARGE NOTE PROVIDER - NSDCMRMEDTOKEN_GEN_ALL_CORE_FT
Aspirin Enteric Coated 81 mg oral delayed release tablet: 1 tab(s) orally once a day  docusate sodium 100 mg oral capsule: 1 cap(s) orally 2 times a day  gabapentin 300 mg oral capsule: 1 cap(s) orally once a day, As needed (per pharmacy, last dispensed 11/8/2018 for a quantity#90. patient reports taking her &quot;pain medication&quot; only as needed)   guaiFENesin 100 mg/5 mL oral liquid: 5 milliliter(s) orally every 6 hours, As needed, Cough  levothyroxine 150 mcg (0.15 mg) oral tablet: Per patient- 0.5 tab(s) orally once a day on Monday and Thursday and 1 tab(s) orally once a day on Tuesday, Wednesday, Friday, Saturday, and Sunday    (However, per pharmacy prescribed as 0.5 tab(s) orally once a day Monday through Thursday and 1 tab(s) orally Friday through Sunday)   Linzess 145 mcg oral capsule: 1 cap(s) orally once a day  melatonin 3 mg oral tablet: 1 tab(s) orally once a day (at bedtime), As needed, Insomnia  metoprolol succinate 25 mg oral tablet, extended release: 1 tab(s) orally once a day  multivitamin: 1 tab(s) orally once a day  oxycodone-acetaminophen 5 mg-325 mg oral tablet: 1 tab(s) orally every 6-8 hours, As Needed    ISTOP Reference#[id number]  Quantity:100  Day Supply: 25  Rx Dispensed: 12/24/2018  PARoxetine 40 mg oral tablet: 1 tab(s) orally once a day  Paxil 30 mg oral tablet: 1 tab(s) orally once a day (per pharmacy, last dispensed on 10/8/2018 for a quantity#90, unclear if patient is still taking this medication as she is an unreliable source of information at this time)  polyethylene glycol 3350 oral powder for reconstitution: 17 gram(s) orally once a day  predniSONE 20 mg oral tablet: 2 tab(s) orally once a day  ProAir HFA 90 mcg/inh inhalation aerosol: 1 puff(s) inhaled every 8 hours, As Needed -for shortness of breath and/or wheezing   Proctozone HC 2.5% rectal cream with applicator: AS NEEDED  simvastatin 20 mg oral tablet: 1 tab(s) orally once a day (at bedtime)  simvastatin 20 mg oral tablet: 1 tab(s) orally once a day (at bedtime)  Symbicort 160 mcg-4.5 mcg/inh inhalation aerosol: 2 puff(s) inhaled 2 times a day  Synthroid 150 mcg (0.15 mg) oral tablet: 1 tab(s) orally once a day  triamterene-hydrochlorothiazide 37.5 mg-25 mg oral capsule: 1 cap(s) orally once a day  Vitamin D3: 1 tab(s) orally once a day   Aspirin Enteric Coated 81 mg oral delayed release tablet: 1 tab(s) orally once a day  docusate sodium 100 mg oral capsule: 1 cap(s) orally 2 times a day  gabapentin 300 mg oral capsule: 1 cap(s) orally once a day, As needed (per pharmacy, last dispensed 11/8/2018 for a quantity#90. patient reports taking her &quot;pain medication&quot; only as needed)   guaiFENesin 100 mg/5 mL oral liquid: 5 milliliter(s) orally every 6 hours, As needed, Cough  levothyroxine 150 mcg (0.15 mg) oral tablet: Per patient- 0.5 tab(s) orally once a day on Monday and Thursday and 1 tab(s) orally once a day on Tuesday, Wednesday, Friday, Saturday, and Sunday    (However, per pharmacy prescribed as 0.5 tab(s) orally once a day Monday through Thursday and 1 tab(s) orally Friday through Sunday)   Linzess 145 mcg oral capsule: 1 cap(s) orally once a day  melatonin 3 mg oral tablet: 1 tab(s) orally once a day (at bedtime), As needed, Insomnia  metoprolol succinate 25 mg oral tablet, extended release: 1 tab(s) orally once a day  multivitamin: 1 tab(s) orally once a day  oxycodone-acetaminophen 5 mg-325 mg oral tablet: 1 tab(s) orally every 6-8 hours, As Needed    ISTOP Reference#[id number]  Quantity:100  Day Supply: 25  Rx Dispensed: 12/24/2018  PARoxetine 40 mg oral tablet: 1 tab(s) orally once a day  Paxil 30 mg oral tablet: 1 tab(s) orally once a day (per pharmacy, last dispensed on 10/8/2018 for a quantity#90, unclear if patient is still taking this medication as she is an unreliable source of information at this time)  polyethylene glycol 3350 oral powder for reconstitution: 17 gram(s) orally once a day  predniSONE 20 mg oral tablet: 2 tab(s) orally once a day  ProAir HFA 90 mcg/inh inhalation aerosol: 1 puff(s) inhaled every 8 hours, As Needed -for shortness of breath and/or wheezing   Proctozone HC 2.5% rectal cream with applicator: AS NEEDED  Salonpas Pain Patch topical film: Apply topically to affected area 2 times a day as needed  simvastatin 20 mg oral tablet: 1 tab(s) orally once a day (at bedtime)  simvastatin 20 mg oral tablet: 1 tab(s) orally once a day (at bedtime)  Symbicort 160 mcg-4.5 mcg/inh inhalation aerosol: 2 puff(s) inhaled 2 times a day  Synthroid 150 mcg (0.15 mg) oral tablet: 1 tab(s) orally once a day  triamterene-hydrochlorothiazide 37.5 mg-25 mg oral capsule: 1 cap(s) orally once a day  Vitamin D3: 1 tab(s) orally once a day   Aspirin Enteric Coated 81 mg oral delayed release tablet: 1 tab(s) orally once a day  diazePAM 5 mg oral tablet: 1 tab(s) orally 2 times a day, As needed, anxiety/agitation  docusate sodium 100 mg oral capsule: 1 cap(s) orally 2 times a day  famotidine 20 mg oral tablet: 1 tab(s) orally once a day  gabapentin 300 mg oral capsule: 1 cap(s) orally once a day, As needed (per pharmacy, last dispensed 11/8/2018 for a quantity#90. patient reports taking her &quot;pain medication&quot; only as needed)   guaiFENesin 100 mg/5 mL oral liquid: 5 milliliter(s) orally every 6 hours, As needed, Cough  levothyroxine 150 mcg (0.15 mg) oral tablet: Per patient- 0.5 tab(s) orally once a day on Monday and Thursday and 1 tab(s) orally once a day on Tuesday, Wednesday, Friday, Saturday, and Sunday    (However, per pharmacy prescribed as 0.5 tab(s) orally once a day Monday through Thursday and 1 tab(s) orally Friday through Sunday)   levothyroxine 150 mcg (0.15 mg) oral tablet: 1 tab(s) orally once a day  Linzess 145 mcg oral capsule: 1 cap(s) orally once a day  magnesium hydroxide 8% oral suspension: 30 milliliter(s) orally once a day, As needed, Constipation  melatonin 3 mg oral tablet: 1 tab(s) orally once a day (at bedtime), As needed, Insomnia  metoprolol succinate 25 mg oral tablet, extended release: 1 tab(s) orally once a day  metoprolol succinate 25 mg oral tablet, extended release: 1 tab(s) orally once a day  multivitamin: 1 tab(s) orally once a day  oxycodone-acetaminophen 5 mg-325 mg oral tablet: 1 tab(s) orally every 6-8 hours, As Needed    ISTOP Reference#[id number]  Quantity:100  Day Supply: 25  Rx Dispensed: 12/24/2018  oxycodone-acetaminophen 5 mg-325 mg oral tablet: 1 tab(s) orally every 12 hours, As needed, Severe Pain (7 - 10)  PARoxetine 30 mg oral tablet: 1 tab(s) orally once a day  PARoxetine 40 mg oral tablet: 1 tab(s) orally once a day  Paxil 30 mg oral tablet: 1 tab(s) orally once a day (per pharmacy, last dispensed on 10/8/2018 for a quantity#90, unclear if patient is still taking this medication as she is an unreliable source of information at this time)  polyethylene glycol 3350 oral powder for reconstitution: 17 gram(s) orally 3 times a day  6 am, 12 noon 6 pm  predniSONE 20 mg oral tablet: 2 tab(s) orally once a day  ProAir HFA 90 mcg/inh inhalation aerosol: 1 puff(s) inhaled every 8 hours, As Needed -for shortness of breath and/or wheezing   Proctozone HC 2.5% rectal cream with applicator: AS NEEDED  Salonpas Pain Patch topical film: Apply topically to affected area 2 times a day as needed  simethicone 80 mg oral tablet, chewable: 1 tab(s) orally 4 times a day  simvastatin 20 mg oral tablet: 1 tab(s) orally once a day (at bedtime)  simvastatin 20 mg oral tablet: 1 tab(s) orally once a day (at bedtime)  simvastatin 20 mg oral tablet: 1 tab(s) orally once a day (at bedtime)  Symbicort 160 mcg-4.5 mcg/inh inhalation aerosol: 2 puff(s) inhaled 2 times a day  Synthroid 150 mcg (0.15 mg) oral tablet: 1 tab(s) orally once a day  triamterene-hydrochlorothiazide 37.5 mg-25 mg oral capsule: 1 cap(s) orally once a day  Vitamin D3: 1 tab(s) orally once a day  Xarelto 10 mg oral tablet: 1 tab(s) orally once a day    acetaminophen 325 mg oral tablet: 2 tab(s) orally every 6 hours, As needed, Temp greater or equal to 38C (100.4F), Mild Pain (1 - 3)  Aspirin Enteric Coated 81 mg oral delayed release tablet: 1 tab(s) orally once a day  docusate sodium 100 mg oral capsule: 1 cap(s) orally 2 times a day  guaiFENesin 100 mg/5 mL oral liquid: 5 milliliter(s) orally every 6 hours, As needed, Cough  levothyroxine 150 mcg (0.15 mg) oral tablet: 1 tab(s) orally once a day  Linzess 145 mcg oral capsule: 1 cap(s) orally once a day  magnesium hydroxide 8% oral suspension: 30 milliliter(s) orally once a day, As needed, Constipation  melatonin 3 mg oral tablet: 1 tab(s) orally once a day (at bedtime), As needed, Insomnia  metoprolol succinate 25 mg oral tablet, extended release: 1 tab(s) orally once a day  multivitamin: 1 tab(s) orally once a day  PARoxetine 30 mg oral tablet: 1 tab(s) orally once a day  polyethylene glycol 3350 oral powder for reconstitution: 17 gram(s) orally 3 times a day  6 am, 12 noon 6 pm  ProAir HFA 90 mcg/inh inhalation aerosol: 1 puff(s) inhaled every 8 hours, As Needed -for shortness of breath and/or wheezing   Proctozone HC 2.5% rectal cream with applicator: AS NEEDED  Salonpas Pain Patch topical film: Apply topically to affected area 2 times a day as needed  simethicone 80 mg oral tablet, chewable: 1 tab(s) orally 4 times a day  simvastatin 20 mg oral tablet: 1 tab(s) orally once a day (at bedtime)  Symbicort 160 mcg-4.5 mcg/inh inhalation aerosol: 2 puff(s) inhaled 2 times a day  triamterene-hydrochlorothiazide 37.5 mg-25 mg oral capsule: 1 cap(s) orally once a day  Vitamin D3: 1 tab(s) orally once a day  Xarelto 10 mg oral tablet: 1 tab(s) orally once a day    acetaminophen 325 mg oral tablet: 2 tab(s) orally every 6 hours, As needed, Temp greater or equal to 38C (100.4F), Mild Pain (1 - 3)  Aspirin Enteric Coated 81 mg oral delayed release tablet: 1 tab(s) orally once a day  diazePAM 5 mg oral tablet: 1 tab(s) orally 2 times a day, As needed, anxiety/agitation MDD:2  docusate sodium 100 mg oral capsule: 1 cap(s) orally 2 times a day  famotidine 20 mg oral tablet: 1 tab(s) orally once a day  guaiFENesin 100 mg/5 mL oral liquid: 5 milliliter(s) orally every 6 hours, As needed, Cough  levothyroxine 150 mcg (0.15 mg) oral tablet: 1 tab(s) orally once a day  Linzess 145 mcg oral capsule: 1 cap(s) orally once a day  magnesium hydroxide 8% oral suspension: 30 milliliter(s) orally once a day, As needed, Constipation  melatonin 3 mg oral tablet: 1 tab(s) orally once a day (at bedtime), As needed, Insomnia  metoprolol succinate 25 mg oral tablet, extended release: 1 tab(s) orally once a day  multivitamin: 1 tab(s) orally once a day  oxycodone-acetaminophen 5 mg-325 mg oral tablet: 1 tab(s) orally every 12 hours, As needed, Severe Pain (7 - 10) MDD:2  PARoxetine 30 mg oral tablet: 1 tab(s) orally once a day  polyethylene glycol 3350 oral powder for reconstitution: 17 gram(s) orally 3 times a day  6 am, 12 noon 6 pm  ProAir HFA 90 mcg/inh inhalation aerosol: 1 puff(s) inhaled every 8 hours, As Needed -for shortness of breath and/or wheezing   Proctozone HC 2.5% rectal cream with applicator: AS NEEDED  Salonpas Pain Patch topical film: Apply topically to affected area 2 times a day as needed  simethicone 80 mg oral tablet, chewable: 1 tab(s) orally 4 times a day  simvastatin 20 mg oral tablet: 1 tab(s) orally once a day (at bedtime)  Symbicort 160 mcg-4.5 mcg/inh inhalation aerosol: 2 puff(s) inhaled 2 times a day  triamterene-hydrochlorothiazide 37.5 mg-25 mg oral capsule: 1 cap(s) orally once a day  Vitamin D3: 1 tab(s) orally once a day  Xarelto 10 mg oral tablet: 1 tab(s) orally once a day    acetaminophen 325 mg oral tablet: 2 tab(s) orally every 6 hours, As needed, Temp greater or equal to 38C (100.4F), Mild Pain (1 - 3)  Aspirin Enteric Coated 81 mg oral delayed release tablet: 1 tab(s) orally once a day  diazePAM 5 mg oral tablet: 1 tab(s) orally 2 times a day, As needed, anxiety/agitation MDD:2  docusate sodium 100 mg oral capsule: 1 cap(s) orally 2 times a day, As Needed  famotidine 20 mg oral tablet: 1 tab(s) orally once a day  guaiFENesin 100 mg/5 mL oral liquid: 5 milliliter(s) orally every 6 hours, As needed, Cough  levothyroxine 150 mcg (0.15 mg) oral tablet: 1 tab(s) orally once a day  Linzess 145 mcg oral capsule: 1 cap(s) orally once a day  Hold for diarrhea  magnesium hydroxide 8% oral suspension: 30 milliliter(s) orally once a day, As needed, Constipation  melatonin 3 mg oral tablet: 1 tab(s) orally once a day (at bedtime), As needed, Insomnia  metoprolol succinate 25 mg oral tablet, extended release: 1 tab(s) orally once a day  multivitamin: 1 tab(s) orally once a day  oxycodone-acetaminophen 5 mg-325 mg oral tablet: 1 tab(s) orally every 12 hours, As needed, Severe Pain (7 - 10) MDD:2  PARoxetine 30 mg oral tablet: 1 tab(s) orally once a day  polyethylene glycol 3350 oral powder for reconstitution: 17 gram(s) orally 3 times a day  6 am, 12 noon 6 pm  Hold for diarrhea  ProAir HFA 90 mcg/inh inhalation aerosol: 1 puff(s) inhaled every 8 hours, As Needed -for shortness of breath and/or wheezing   Proctozone HC 2.5% rectal cream with applicator: AS NEEDED  Salonpas Pain Patch topical film: Apply topically to affected area 2 times a day as needed  simethicone 80 mg oral tablet, chewable: 1 tab(s) orally 4 times a day  simvastatin 20 mg oral tablet: 1 tab(s) orally once a day (at bedtime)  Symbicort 160 mcg-4.5 mcg/inh inhalation aerosol: 2 puff(s) inhaled 2 times a day  triamterene-hydrochlorothiazide 37.5 mg-25 mg oral capsule: 1 cap(s) orally once a day  Vitamin D3: 1 tab(s) orally once a day  Xarelto 10 mg oral tablet: 1 tab(s) orally once a day

## 2021-01-20 NOTE — DISCHARGE NOTE PROVIDER - CARE PROVIDER_API CALL
Edmond Sarabia  GASTROENTEROLOGY  [address], [address]  [place], [place] [address]  Phone: [phone]  Fax: [phone]  Follow Up Time:     Canelo Rosen)  Gastroenterology  [address], [address]  [place] [address]  Phone: [phone]  Fax: [phone]  Follow Up Time:    Emdond Sarabia  GASTROENTEROLOGY  [address], [address]  [place], [place] [id number]  Phone: [phone]  Fax: [phone]  Follow Up Time:     Canelo Rosen)  Gastroenterology  [address], [address]  [place] [address]  Phone: [phone]  Fax: [phone]  Follow Up Time:     Gomez Wu  INTERNAL MEDICINE  [address] [place] [address]  Phone: [phone]  Fax: [phone]  Follow Up Time:

## 2021-01-20 NOTE — PHYSICAL THERAPY INITIAL EVALUATION ADULT - PERTINENT HX OF CURRENT PROBLEM, REHAB EVAL
IBS, essential HTN (patient does not know her BP medications), thyroid disorder (patient does not know her Rx), with the patient apparently with one day of fever, dry coughing paroxysms, with intermittent LUQ pain. 1/19/20 (+)Covid-19 as per chart review: IBS, essential HTN (patient does not know her BP medications), thyroid disorder (patient does not know her Rx), with the patient apparently with one day of fever, dry coughing paroxysms, with intermittent LUQ pain. 1/19/20 (+)Covid-19

## 2021-01-20 NOTE — DISCHARGE NOTE PROVIDER - HOSPITAL COURSE
85 f with COVID-19 infection in the setting of patient with no present oxygen requirements in the setting of patient with essential HTN< reported thyroid disorder (patient does not know her medications) with mild acute kidney injury and presumed cystitis.  Received IV antibiotics for presumed bacterial pneumonia but CTT chest does not corroborate this diagnosis.  Will continue with IV Rocephin for now for the presumed cystitis with symptoms but at present, patient does not require supplemental oxygenation and Decadron is currently not indicated in the setting of current Emergency Use Authorization by the FDA.       CTT abdomen nondiagnostic for LUQ pain.    2019 novel coronavirus disease (COVID-19).  - continue with IV Rocephin for presumed cystitis for now.  At present, no oxygen requirements, with mild TYLER.   - follow Inflammatory indices   - ID evaluation noted    Left upper quadrant abdominal pain.   -  Non diagnostic CTT abdomen.  Pepcid 20 mg daily for now.   - Gastroenterology evaluation    Constipation  - bowel regimen    Acute kidney injury.  - follow lytes, cr     Primary hypertension.   - control.     Breast calcification, right.  - Breast US.     Thyroid disorder.   - TSH.     85 f with COVID-19 infection in the setting of patient with no present oxygen requirements in the setting of patient with essential HTN< reported   thyroid disorder (patient does not know her medications) with mild acute kidney injury and presumed cystitis.  Received IV antibiotics for presumed   bacterial pneumonia but CTT chest does not corroborate this diagnosis.  Will continue with IV Rocephin for now for the presumed cystitis with   symptoms but at present, patient does not require supplemental oxygenation and Decadron is currently not indicated in the setting of current   Emergency Use Authorization by the FDA.       CTT abdomen nondiagnostic for LUQ pain.    2019 novel coronavirus disease (COVID-19).  - continue with IV Rocephin for presumed cystitis for now.  At present, no oxygen requirements, with mild TYLER.   - follow Inflammatory indices   - ID evaluation noted    Left upper quadrant abdominal pain.   -  Non diagnostic CTT abdomen.  Pepcid 20 mg daily for now.   - Gastroenterology evaluation    Constipation  - bowel regimen    Acute kidney injury.  - follow lytes, cr     Primary hypertension.   - control.     Breast calcification, right.  - Breast US.     Thyroid disorder.   - TSH.     85 f with COVID-19 infection in the setting of patient with no present oxygen requirements in the setting of patient with essential HTN< reported   thyroid disorder (patient does not know her medications) with mild acute kidney injury and presumed cystitis.  Received IV antibiotics for presumed   bacterial pneumonia but CTT chest does not corroborate this diagnosis.  Will continue with IV Rocephin for now for the presumed cystitis with   symptoms but at present, patient does not require supplemental oxygenation and Decadron is currently not indicated in the setting of current   Emergency Use Authorization by the FDA.       CT abdomen nondiagnostic for LUQ pain. Seen by GI, recomm to continue laxative. TYLER now resolved. Incidental findings of R breast calcification  noted on CT chest, to follow up as out patient. Patient did not require supplemental oxygen. Now cleared for discharge. Discharge medication  reconciliation DW Dr Wu, as per VTE improved score, ORTEGA on Xarelto.           85 f with COVID-19 infection in the setting of patient with no present oxygen requirements in the setting of patient with essential HTN< reported   thyroid disorder (patient does not know her medications) with mild acute kidney injury and presumed cystitis.  Received IV antibiotics for presumed   bacterial pneumonia but CTT chest does not corroborate this diagnosis.  Will continue with IV Rocephin for now for the presumed cystitis with   symptoms but at present, patient does not require supplemental oxygenation and Decadron is currently not indicated in the setting of current   Emergency Use Authorization by the FDA.       CT abdomen nondiagnostic for LUQ pain. Seen by GI, recomm to continue laxative. YTLER now resolved. Incidental findings of R breast calcification  noted on CT chest, to follow up as out patient. Patient did not require supplemental oxygen. Now cleared for discharge. Discharge medication  reconciliation DW Dr Wu, as per VTE improved score, ORTEGA on Xarelto.

## 2021-01-20 NOTE — DISCHARGE NOTE PROVIDER - PROVIDER TOKENS
PROVIDER:[TOKEN:[[id number]]],PROVIDER:[TOKEN:[[id number]]] PROVIDER:[TOKEN:[[id number]]],PROVIDER:[TOKEN:[[id number]]],PROVIDER:[TOKEN:[[id number]]]

## 2021-01-20 NOTE — DISCHARGE NOTE PROVIDER - NSDCCPCAREPLAN_GEN_ALL_CORE_FT
PRINCIPAL DISCHARGE DIAGNOSIS  Diagnosis: 2019 novel coronavirus disease (COVID-19)  Assessment and Plan of Treatment: Complete abx as recommended  Quarantine yourself for 14 days   Please follow up with your PCP in 1-2 weeks -Call your Provider before hand to make then aware of your hospitalization   Take Tylenol for Fevers every 6 hours as needed- Do not exceed 4gm of Tylenol in a 24 hour period  Stay hydrated   WEAR A FACE MASK   Cover your cough and sneezes   Clean your hands often   Avoid sharing personal house hold items   Clean all high touch surfaces- everyday items like table tops , door knobs, cell phones etc   You should restrict activities outside your home except for getting medical care   Avoid using public transportation  Do not go to work, school, or Public areas   Monitor your oxygen saturation         SECONDARY DISCHARGE DIAGNOSES  Diagnosis: Pulmonary nodule  Assessment and Plan of Treatment: Nonspecific pulmonary nodules. If the patient is at low risk for malignancy, no further follow-up is needed. If the patient is at high risk, 12 month follow-up CT chest .    Diagnosis: TYLER (acute kidney injury)  Assessment and Plan of Treatment: Avoid taking (NSAIDs) - (ex: Ibuprofen, Advil, Celebrex, Naprosyn)  Avoid taking any nephrotoxic agents (can harm kidneys) - Intravenous contrast for diagnostic testing, combination cold medications.  Have all medications adjusted for your renal function by your Health Care Provider.  Blood pressure control is important.  Take all medication as prescribed.      Diagnosis: Urinary tract infection  Assessment and Plan of Treatment: HOME CARE INSTRUCTIONS  f you were prescribed antibiotics, take them exactly as your caregiver instructs you. Finish the medication even if you feel better after you have only taken some of the medication.  Drink enough water and fluids to keep your urine clear or pale yellow.  Avoid caffeine, tea, and carbonated beverages. They tend to irritate your bladder.  Empty your bladder often. Avoid holding urine for long periods of time.  Empty your bladder before and after sexual intercourse.  After a bowel movement, women should cleanse from front to back. Use each tissue only once.  SEEK MEDICAL CARE IF:  You have back pain.  You develop a fever.  Your symptoms do not begin to resolve within 3 days.  SEEK IMMEDIATE MEDICAL CARE IF:  You have severe back pain or lower abdominal pain.  You develop chills.  You have nausea or vomiting.  You have continued burning or discomfort with urination.      Diagnosis: Breast calcification, right  Assessment and Plan of Treatment: Follow up with PMD and GYN for managment    Diagnosis: Primary hypertension  Assessment and Plan of Treatment: Follow up with your medical doctor to establish long term blood pressure treatment goals.       PRINCIPAL DISCHARGE DIAGNOSIS  Diagnosis: 2019 novel coronavirus disease (COVID-19)  Assessment and Plan of Treatment: Your Oxygen level was accepatable, did not require any supplemental oxygen.  Quarantine yourself for 14 days   Please follow up with your PCP in 1-2 weeks -Call your Provider before hand to make then aware of your hospitalization   Take Tylenol for Fevers every 6 hours as needed- Do not exceed 4gm of Tylenol in a 24 hour period  Stay hydrated   WEAR A FACE MASK   Cover your cough and sneezes   Clean your hands often   Avoid sharing personal house hold items   Clean all high touch surfaces- everyday items like table tops , door knobs, cell phones etc   You should restrict activities outside your home except for getting medical care   Avoid using public transportation  Do not go to work, school, or Public areas   Monitor your oxygen saturation   You need to take Xarelto daily for 30 days for elevated D dimer, which is an indication for high risk for blood clot formation .        SECONDARY DISCHARGE DIAGNOSES  Diagnosis: Irritable bowel syndrome (IBS)  Assessment and Plan of Treatment: Continue bowel regimen. Follow up with your gastroenterologist    Diagnosis: Pulmonary nodule  Assessment and Plan of Treatment: Nonspecific pulmonary nodules. If the patient is at low risk for malignancy, no further follow-up is needed. If the patient is at high risk, 12 month follow-up CT chest .    Diagnosis: TYLER (acute kidney injury)  Assessment and Plan of Treatment: Your creatinine level was elevated, now back to normal. Avoid taking (NSAIDs) - (ex: Ibuprofen, Advil, Celebrex, Naprosyn)  Avoid taking any nephrotoxic agents (can harm kidneys) - Intravenous contrast for diagnostic testing, combination cold medications.  Have all medications adjusted for your renal function by your Health Care Provider.  Blood pressure control is important.  Take all medication as prescribed.      Diagnosis: Urinary tract infection  Assessment and Plan of Treatment:   Treated with antibiotic HOME CARE INSTRUCTIONS  f you were prescribed antibiotics, take them exactly as your caregiver instructs you. Finish the medication even if you feel better after you have only taken some of the medication.  Drink enough water and fluids to keep your urine clear or pale yellow.  Avoid caffeine, tea, and carbonated beverages. They tend to irritate your bladder.  Empty your bladder often. Avoid holding urine for long periods of time.  Empty your bladder before and after sexual intercourse.  After a bowel movement, women should cleanse from front to back. Use each tissue only once.  SEEK MEDICAL CARE IF:  You have back pain.  You develop a fever.  Your symptoms do not begin to resolve within 3 days.  SEEK IMMEDIATE MEDICAL CARE IF:  You have severe back pain or lower abdominal pain.  You develop chills.  You have nausea or vomiting.  You have continued burning or discomfort with urination.      Diagnosis: Anxiety  Assessment and Plan of Treatment: Continue current medications, follow up with your Primary doctor    Diagnosis: Breast calcification, right  Assessment and Plan of Treatment: Follow up with PMD , incidental finding on CT scan     PRINCIPAL DISCHARGE DIAGNOSIS  Diagnosis: 2019 novel coronavirus disease (COVID-19)  Assessment and Plan of Treatment: Your Oxygen level was accepatable, did not require any supplemental oxygen.  Quarantine yourself for 14 days   Please follow up with your PCP in 1-2 weeks -Call your Provider before hand to make then aware of your hospitalization   Take Tylenol for Fevers every 6 hours as needed- Do not exceed 4gm of Tylenol in a 24 hour period  Stay hydrated   WEAR A FACE MASK   Cover your cough and sneezes   Clean your hands often   Avoid sharing personal house hold items   Clean all high touch surfaces- everyday items like table tops , door knobs, cell phones etc   You should restrict activities outside your home except for getting medical care   Avoid using public transportation  Do not go to work, school, or Public areas   Monitor your oxygen saturation   You need to take Xarelto daily for 30 days for elevated D dimer, which is an indication for high risk for blood clot formation .        SECONDARY DISCHARGE DIAGNOSES  Diagnosis: Irritable bowel syndrome (IBS)  Assessment and Plan of Treatment: Continue bowel regimen. Follow up with your gastroenterologist    Diagnosis: Pulmonary nodule  Assessment and Plan of Treatment: Nonspecific pulmonary nodules. If the patient is at low risk for malignancy, no further follow-up is needed. If the patient is at high risk, 12 month follow-up CT chest .    Diagnosis: TYLER (acute kidney injury)  Assessment and Plan of Treatment: Your creatinine level was elevated, now back to normal. Avoid taking (NSAIDs) - (ex: Ibuprofen, Advil, Celebrex, Naprosyn)  Avoid taking any nephrotoxic agents (can harm kidneys) - Intravenous contrast for diagnostic testing, combination cold medications.  Have all medications adjusted for your renal function by your Health Care Provider.  Blood pressure control is important.  Take all medication as prescribed.      Diagnosis: Urinary tract infection  Assessment and Plan of Treatment:   Treated with antibiotic HOME CARE INSTRUCTIONS  f you were prescribed antibiotics, take them exactly as your caregiver instructs you. Finish the medication even if you feel better after you have only taken some of the medication.  Drink enough water and fluids to keep your urine clear or pale yellow.  Avoid caffeine, tea, and carbonated beverages. They tend to irritate your bladder.  Empty your bladder often. Avoid holding urine for long periods of time.  Empty your bladder before and after sexual intercourse.  After a bowel movement, women should cleanse from front to back. Use each tissue only once.  SEEK MEDICAL CARE IF:  You have back pain.  You develop a fever.  Your symptoms do not begin to resolve within 3 days.  SEEK IMMEDIATE MEDICAL CARE IF:  You have severe back pain or lower abdominal pain.  You develop chills.  You have nausea or vomiting.  You have continued burning or discomfort with urination.      Diagnosis: Anxiety  Assessment and Plan of Treatment: Continue current medications, follow up with your Primary doctor  Recommends to follow up 	OP psych  at [facility]- [phone]  [facility] clinic- [phone]      Diagnosis: Breast calcification, right  Assessment and Plan of Treatment: Follow up with PMD , incidental finding on CT scan

## 2021-01-21 DIAGNOSIS — F41.9 ANXIETY DISORDER, UNSPECIFIED: ICD-10-CM

## 2021-01-21 PROCEDURE — 99223 1ST HOSP IP/OBS HIGH 75: CPT | Mod: CS

## 2021-01-21 PROCEDURE — 99222 1ST HOSP IP/OBS MODERATE 55: CPT

## 2021-01-21 RX ORDER — MAGNESIUM HYDROXIDE 400 MG/1
30 TABLET, CHEWABLE ORAL DAILY
Refills: 0 | Status: DISCONTINUED | OUTPATIENT
Start: 2021-01-21 | End: 2021-01-23

## 2021-01-21 RX ORDER — ALPRAZOLAM 0.25 MG
0.25 TABLET ORAL ONCE
Refills: 0 | Status: DISCONTINUED | OUTPATIENT
Start: 2021-01-21 | End: 2021-01-21

## 2021-01-21 RX ORDER — POLYETHYLENE GLYCOL 3350 17 G/17G
17 POWDER, FOR SOLUTION ORAL DAILY
Refills: 0 | Status: DISCONTINUED | OUTPATIENT
Start: 2021-01-21 | End: 2021-01-21

## 2021-01-21 RX ORDER — DIAZEPAM 5 MG
5 TABLET ORAL
Refills: 0 | Status: DISCONTINUED | OUTPATIENT
Start: 2021-01-21 | End: 2021-01-23

## 2021-01-21 RX ORDER — POLYETHYLENE GLYCOL 3350 17 G/17G
17 POWDER, FOR SOLUTION ORAL
Refills: 0 | Status: DISCONTINUED | OUTPATIENT
Start: 2021-01-21 | End: 2021-01-23

## 2021-01-21 RX ORDER — BUDESONIDE AND FORMOTEROL FUMARATE DIHYDRATE 160; 4.5 UG/1; UG/1
2 AEROSOL RESPIRATORY (INHALATION)
Qty: 0 | Refills: 0 | DISCHARGE

## 2021-01-21 RX ORDER — TRIAMTERENE/HYDROCHLOROTHIAZID 75 MG-50MG
1 TABLET ORAL
Qty: 0 | Refills: 0 | DISCHARGE

## 2021-01-21 RX ORDER — SIMETHICONE 80 MG/1
80 TABLET, CHEWABLE ORAL
Refills: 0 | Status: DISCONTINUED | OUTPATIENT
Start: 2021-01-21 | End: 2021-01-23

## 2021-01-21 RX ORDER — LANOLIN ALCOHOL/MO/W.PET/CERES
3 CREAM (GRAM) TOPICAL AT BEDTIME
Refills: 0 | Status: DISCONTINUED | OUTPATIENT
Start: 2021-01-21 | End: 2021-01-23

## 2021-01-21 RX ADMIN — LIDOCAINE 1 PATCH: 4 CREAM TOPICAL at 20:22

## 2021-01-21 RX ADMIN — LINACLOTIDE 290 MICROGRAM(S): 145 CAPSULE, GELATIN COATED ORAL at 06:10

## 2021-01-21 RX ADMIN — Medication 25 MILLIGRAM(S): at 06:10

## 2021-01-21 RX ADMIN — SIMETHICONE 80 MILLIGRAM(S): 80 TABLET, CHEWABLE ORAL at 17:07

## 2021-01-21 RX ADMIN — Medication 30 MILLIGRAM(S): at 13:15

## 2021-01-21 RX ADMIN — POLYETHYLENE GLYCOL 3350 17 GRAM(S): 17 POWDER, FOR SOLUTION ORAL at 09:20

## 2021-01-21 RX ADMIN — MAGNESIUM HYDROXIDE 30 MILLILITER(S): 400 TABLET, CHEWABLE ORAL at 18:30

## 2021-01-21 RX ADMIN — SIMVASTATIN 20 MILLIGRAM(S): 20 TABLET, FILM COATED ORAL at 22:30

## 2021-01-21 RX ADMIN — ENOXAPARIN SODIUM 40 MILLIGRAM(S): 100 INJECTION SUBCUTANEOUS at 17:06

## 2021-01-21 RX ADMIN — LIDOCAINE 1 PATCH: 4 CREAM TOPICAL at 13:15

## 2021-01-21 RX ADMIN — SIMETHICONE 80 MILLIGRAM(S): 80 TABLET, CHEWABLE ORAL at 22:30

## 2021-01-21 RX ADMIN — BUDESONIDE AND FORMOTEROL FUMARATE DIHYDRATE 2 PUFF(S): 160; 4.5 AEROSOL RESPIRATORY (INHALATION) at 17:08

## 2021-01-21 RX ADMIN — POLYETHYLENE GLYCOL 3350 17 GRAM(S): 17 POWDER, FOR SOLUTION ORAL at 17:07

## 2021-01-21 RX ADMIN — OXYCODONE AND ACETAMINOPHEN 1 TABLET(S): 5; 325 TABLET ORAL at 22:53

## 2021-01-21 RX ADMIN — Medication 150 MICROGRAM(S): at 06:10

## 2021-01-21 RX ADMIN — ENOXAPARIN SODIUM 40 MILLIGRAM(S): 100 INJECTION SUBCUTANEOUS at 06:10

## 2021-01-21 RX ADMIN — BUDESONIDE AND FORMOTEROL FUMARATE DIHYDRATE 2 PUFF(S): 160; 4.5 AEROSOL RESPIRATORY (INHALATION) at 06:10

## 2021-01-21 RX ADMIN — FAMOTIDINE 20 MILLIGRAM(S): 10 INJECTION INTRAVENOUS at 13:15

## 2021-01-21 RX ADMIN — Medication 0.25 MILLIGRAM(S): at 17:06

## 2021-01-21 RX ADMIN — Medication 0.25 MILLIGRAM(S): at 08:55

## 2021-01-21 NOTE — BEHAVIORAL HEALTH ASSESSMENT NOTE - SUICIDE PROTECTIVE FACTORS
Responsibility to family and others/Identifies reasons for living/Has future plans/Supportive social network of family or friends/Cultural, spiritual and/or moral attitudes against suicide/Positive therapeutic relationships

## 2021-01-21 NOTE — CONSULT NOTE ADULT - SUBJECTIVE AND OBJECTIVE BOX
HPI:   Patient is a 85y female with obesity, ibs who got exposed to her  who was diagnosed with covid and told to go to the hospital to get checked. She complains of left sided pain that comes in spasms and feels she needs to have a bowel movement. She reports her highest temperature was 99. She has no cough or sob. No vomiting or nausea. No body aches. She tested positive for covid    REVIEW OF SYSTEMS:  All other review of systems negative (Comprehensive ROS)    PAST MEDICAL & SURGICAL HISTORY:  History of IBS    Thyroid disorder    Primary hypertension    Disorder of thoracic spine        Allergies    No Known Allergies    Intolerances        Antimicrobials Day #  :2  cefTRIAXone   IVPB 1000 milliGRAM(s) IV Intermittent every 24 hours    Other Medications:  acetaminophen   Tablet .. 650 milliGRAM(s) Oral every 6 hours PRN  ALPRAZolam 0.25 milliGRAM(s) Oral every 12 hours PRN  budesonide 160 MICROgram(s)/formoterol 4.5 MICROgram(s) Inhaler 2 Puff(s) Inhalation two times a day  enoxaparin Injectable 40 milliGRAM(s) SubCutaneous every 12 hours  famotidine    Tablet 20 milliGRAM(s) Oral daily  influenza   Vaccine 0.5 milliLiter(s) IntraMuscular once  levothyroxine 150 MICROGram(s) Oral daily  lidocaine   Patch 1 Patch Transdermal daily  linaclotide 290 MICROGram(s) Oral before breakfast  metoprolol succinate ER 25 milliGRAM(s) Oral daily  oxycodone    5 mG/acetaminophen 325 mG 1 Tablet(s) Oral every 12 hours PRN  PARoxetine 30 milliGRAM(s) Oral daily  polyethylene glycol 3350 17 Gram(s) Oral daily  simvastatin 20 milliGRAM(s) Oral at bedtime      FAMILY HISTORY:  FH: hypertension        SOCIAL HISTORY:  Smoking: [ ]Yes [ ]x  ETOH: [ ]Yes x[ ]No  Drug Use: [ ]Yes [ x]No   [ x] Single[ ]    T(F): 98.1 (01-20-21 @ 23:38), Max: 98.5 (01-20-21 @ 19:55)  HR: 89 (01-20-21 @ 23:38)  BP: 127/63 (01-20-21 @ 23:38)  RR: 18 (01-20-21 @ 23:38)  SpO2: 96% (01-20-21 @ 23:38)  Wt(kg): --    PHYSICAL EXAM:  General: alert, no acute distress until she gets a spasm  Eyes:  anicteric, no conjunctival injection, no discharge  Oropharynx: no lesions or injection 	  Neck: supple, without adenopathy  Lungs: clear to auscultation  Heart: regular rate and rhythm; no murmur, rubs or gallops  Abdomen: soft, nondistended, nontender, without mass or organomegaly  Skin: no lesions  Extremities: no clubbing, cyanosis, or edema  Neurologic: alert, oriented, moves all extremities    LAB RESULTS:                        12.0   6.80  )-----------( 232      ( 20 Jan 2021 07:24 )             36.6     01-20    137  |  99  |  25<H>  ----------------------------<  100<H>  4.0   |  22  |  1.32<H>    Ca    9.4      20 Jan 2021 07:20    TPro  7.3  /  Alb  3.9  /  TBili  0.2  /  DBili  x   /  AST  33  /  ALT  20  /  AlkPhos  57  01-20    LIVER FUNCTIONS - ( 20 Jan 2021 07:20 )  Alb: 3.9 g/dL / Pro: 7.3 g/dL / ALK PHOS: 57 U/L / ALT: 20 U/L / AST: 33 U/L / GGT: x           Urinalysis Basic - ( 19 Jan 2021 21:39 )    Color: Light Yellow / Appearance: Clear / SG: >1.050 / pH: x  Gluc: x / Ketone: Negative  / Bili: Negative / Urobili: Negative   Blood: x / Protein: Trace / Nitrite: Negative   Leuk Esterase: Moderate / RBC: 1 /hpf / WBC 22 /HPF   Sq Epi: x / Non Sq Epi: 2 / Bacteria: Negative    d dimer 505 ferritin 170 crp .43    MICROBIOLOGY:  RECENT CULTURES:  01-20 @ 01:30 .Urine Clean Catch (Midstream)     <10,000 CFU/mL Normal Urogenital Katie            RADIOLOGY REVIEWED:    < from: CT Chest No Cont (01.19.21 @ 20:34) >    EXAM:  CT CHEST                            PROCEDURE DATE:  01/19/2021            INTERPRETATION:  CLINICAL INFORMATION: Fever.    COMPARISON: Same day abdominal CT.    PROCEDURE:  CT of the Chest was performed without intravenous contrast.  Sagittal and coronal reformats were performed.    FINDINGS: Artifact from the patient's arms degrades images. Evaluation of the thoracic organs/vasculature is limited without intravenous contrast.    LUNGS AND AIRWAYS: Patent central airways. Mild centrilobular emphysema. No focal consolidation. Subsegmental bibasilar atelectasis. Calcified granuloma in the left lower lobe. Scattered small bilateral pulmonary nodules, for example, 0.4 x 0.5 cm in the left lower lobe (3:329).  PLEURA: No pleural effusionor pneumothorax.  HEART: Normal size. No pericardial effusion.  VESSELS: Atherosclerosis. Normal caliber of the thoracic aorta. Retropharyngeal course of the tortuous, visualized proximal common carotid arteries. Main pulmonary artery enlargement (3.3 cm), suggestive of pulmonary arterial hypertension.  MEDIASTINUM AND AKBAR: Subcentimeter mediastinal lymph nodes without lymphadenopathy.  CHEST WALL AND LOWER NECK: Nonspecific punctate calcification in the right breast soft tissue.  UPPER ABDOMEN:Small hiatal hernia. Diffuse fatty pancreatic atrophy. Excreted contrast in the visualized proximal collecting system.  BONES: Degenerative changes of the spine. Post surgical changes from the prior laminectomy in the midthoracic spine. Likely chronic mild anterior wedging of the midthoracic spine. Probable intra-articular body along the posterior right glenohumeral joint.    IMPRESSION:    No focal consolidation.    Nonspecific pulmonary nodules. If the patient is at low risk for malignancy, no further follow-up is needed. If the patient is at high risk, 12 month follow-up CT chest may be obtained for further evaluation.    Additional findings as described.            < end of copied text >        Impression:  Elderly woman with covid. I do not see that she has any anali symptoms and her oxygen sat is running 96 and she has no infiltrates. THe spasms she is having appear unrelated to covid and are of unclear etiology to me unless she is having constipation or other symptoms of ibs. She has a negative urine culture so uti is not apparent. I dont see evidence of any other infection other than covid and at present given no infiltrates, good sats, no fever, I see no indication to treat for covid    Recommendations:  monitor off further antibiotics  no indication for specific covid treatment at present. 
    Patient is a 85y old  Female who presents with a chief complaint of Fever, cough x 1 day, LUQ pain (21 Jan 2021 13:18)      HPI:  86 y/o F--followed by her office physician above--patient with a history of reported IBS, essential HTN (patient does not know her BP medications), thyroid disorder (patient does not know her Rx), with the patient apparently with one day of fever, dry coughing paroxysms, with intermittent LUQ pain.  NO N/V.  NO diarrhea, no obstipation.  Patient's spouse apparently was diagnosed with SARS-CoV2 and admitted at [facility], with patient's brother in law (they reside in the same house also unruly COVID-19. (19 Jan 2021 23:35)    Pt with baseline Dx IBS -C - follows with primary GI (Dr Rosen)  colonoscopy within ~5 years - negative, no IBD per pt  EGD within past 2 years    Pt takes Miralax TID and MOM (daily PRN if no BM) as well as Linzess daily  states +left sided abdominal cramping/"spasms" prior to BM and increased severity if has constipation    Currently c/o of left sided crampy spasm type pain - as described above  last BM yesterday - soft, brown formed stool    Pt expressing frustration that she is only on Miralax daily adn would like her home bowel regimen ordered  She has been receiving Linzess (Meds administration reviewed - received daily dose in AM 1/20 and 1/21)  tolerating PO diet    no diarrhea, rectal bleeding or melena  appetite good per pt and RN report    no further fever  cough - improving  not requiring supplemental oxygen    PAST MEDICAL & SURGICAL HISTORY:  History of IBS  Thyroid disorder  Primary hypertension  Hyperlipidemia, unspecified hyperlipidemia type  HTN (hypertension)  Disorder of thoracic spine        Allergies  No Known Allergies    Intolerances  Broccoli (Stomach Upset)  Cheese (Stomach Upset)      MEDICATIONS  (STANDING):  ALPRAZolam 0.25 milliGRAM(s) Oral once  budesonide 160 MICROgram(s)/formoterol 4.5 MICROgram(s) Inhaler 2 Puff(s) Inhalation two times a day  enoxaparin Injectable 40 milliGRAM(s) SubCutaneous every 12 hours  famotidine    Tablet 20 milliGRAM(s) Oral daily  influenza   Vaccine 0.5 milliLiter(s) IntraMuscular once  levothyroxine 150 MICROGram(s) Oral daily  lidocaine   Patch 1 Patch Transdermal daily  linaclotide 290 MICROGram(s) Oral before breakfast  metoprolol succinate ER 25 milliGRAM(s) Oral daily  PARoxetine 30 milliGRAM(s) Oral daily  polyethylene glycol 3350 17 Gram(s) Oral daily  simvastatin 20 milliGRAM(s) Oral at bedtime    MEDICATIONS  (PRN):  acetaminophen   Tablet .. 650 milliGRAM(s) Oral every 6 hours PRN Temp greater or equal to 38C (100.4F), Mild Pain (1 - 3)  ALPRAZolam 0.25 milliGRAM(s) Oral every 12 hours PRN anxiety  oxycodone    5 mG/acetaminophen 325 mG 1 Tablet(s) Oral every 12 hours PRN Severe Pain (7 - 10)      Social History:    , lives with spouse (currently hospitalized at [facility])  2 family home; lives with sister and brother in law  (both hospitalized)    Family History   IBD (  ) Yes   ( X ) No  GI Malignancy (  )  Yes    ( X ) No      Advanced Directives: (  X   ) None    (      ) DNR    (     ) DNI    (     ) Health Care Proxy:     Review of Systems:    General:  No wt loss, chills, night sweats +fever - resolved  CV:  No pain, palpitations, +HTN  Resp:  No dyspnea,  tachypnea, wheezing +cough (improved)  GI:  see HPI  :  No pain, bleeding, incontinence, nocturia  Muscle:  No pain, weakness  Neuro:  No focal weakness, tingling, memory problems  Psych:  No fatigue, insomnia, mood problems, depression  Endocrine:  No polyuria, polydypsia, cold/heat intolerance  Heme:  No petechiae, ecchymosis, easy bruisability  Skin:  No rash, tattoos, scars, edema      Vital Signs Last 24 Hrs  T(C): 36.8 (21 Jan 2021 12:49), Max: 36.9 (20 Jan 2021 19:55)  T(F): 98.2 (21 Jan 2021 12:49), Max: 98.5 (20 Jan 2021 19:55)  HR: 84 (21 Jan 2021 12:49) (75 - 89)  BP: 121/64 (21 Jan 2021 12:49) (112/62 - 127/63)  BP(mean): --  RR: 18 (21 Jan 2021 12:49) (18 - 18)  SpO2: 97% (21 Jan 2021 12:49) (96% - 98%)    PHYSICAL EXAM:    Constitutional: anxious appearing, intermittently tearful   non toxic appearing elderly WF  alert and responsive  Mouth: poor dentition, no plaques  Neck: No LAD, supple no JVD  Respiratory: Clear, no accessory muscle use. no wheezing  Cardiovascular: S1 and S2, RRR  Gastrointestinal: BS+, obese soft, ND  generalized tenderness to mild/moderate palpation Left side - decreased with distraction. no rebound or rigidity  Extremities: warm, good pulses b/l  no ulcers  Vascular: 2+ peripheral pulses  Neurological: A/O x 3, no focal deficits  Psychiatric: anxious appearing, intermittently tearful  Skin: No rashes, anicteric. no lesions/ulcerations        LABS:                        12.0   6.80  )-----------( 232      ( 20 Jan 2021 07:24 )             36.6     01-20    137  |  99  |  25<H>  ----------------------------<  100<H>  4.0   |  22  |  1.32<H>    Ca    9.4      20 Jan 2021 07:20    TPro  7.3  /  Alb  3.9  /  TBili  0.2  /  DBili  x   /  AST  33  /  ALT  20  /  AlkPhos  57  01-20    PT/INR - ( 19 Jan 2021 16:07 )   PT: 12.6 sec;   INR: 1.05 ratio    PTT - ( 19 Jan 2021 16:07 )  PTT:26.3 sec    Urinalysis Basic - ( 19 Jan 2021 21:39 )    Color: Light Yellow / Appearance: Clear / SG: >1.050 / pH: x  Gluc: x / Ketone: Negative  / Bili: Negative / Urobili: Negative   Blood: x / Protein: Trace / Nitrite: Negative   Leuk Esterase: Moderate / RBC: 1 /hpf / WBC 22 /HPF   Sq Epi: x / Non Sq Epi: 2 / Bacteria: Negative     Thyroid Stimulating Hormone, Serum (01.20.21 @ 10:25)   Thyroid Stimulating Hormone, Serum: 1.92 uIU/mL     Ferritin, Serum (01.20.21 @ 10:40)   Ferritin, Serum: 159 ng/mL     COVID-19 PCR . (01.19.21 @ 16:58)   COVID-19 PCR: Detected:      RADIOLOGY & ADDITIONAL TESTS:  EXAM:  CT ABDOMEN AND PELVIS IC                        PROCEDURE DATE:  01/19/2021        INTERPRETATION:  CLINICAL INFORMATION: Abdominal pain. Covid exposure.    COMPARISON: None.    PROCEDURE:  CT of the Abdomen and Pelvis was performed with intravenous contrast.  Intravenous contrast: 90 ml Omnipaque 350. 10 ml discarded.  Oral contrast: None.  Sagittal and coronal reformats were performed.    FINDINGS:  LOWER CHEST: Within normal limits.    LIVER: Within normal limits.  BILE DUCTS:Normal caliber.  GALLBLADDER: Within normal limits.  SPLEEN: Within normal limits.  PANCREAS: Within normal limits.  ADRENALS: Within normal limits.  KIDNEYS/URETERS: Bilateral lower pole hypodense foci, too small to characterize. No hydronephrosis.    BLADDER: Within normal limits.  REPRODUCTIVE ORGANS: Hysterectomy.    BOWEL: Tiny hiatal hernia. No bowel obstruction. Appendix is not visualized.  PERITONEUM: No ascites.  VESSELS: Atherosclerotic changes.  RETROPERITONEUM/LYMPH NODES: No lymphadenopathy.  ABDOMINAL WALL: Within normal limits.  BONES: Degenerative changes. Grade 1 anterolisthesis of L4 on L5.    IMPRESSION:    No acute intra-abdominal pathology.

## 2021-01-21 NOTE — BEHAVIORAL HEALTH ASSESSMENT NOTE - NSBHCHARTREVIEWVS_PSY_A_CORE FT
T(C): 36.8 (01-21-21 @ 12:49), Max: 36.9 (01-20-21 @ 19:55)  HR: 84 (01-21-21 @ 12:49) (75 - 89)  BP: 121/64 (01-21-21 @ 12:49) (112/62 - 127/63)  RR: 18 (01-21-21 @ 12:49) (18 - 18)  SpO2: 97% (01-21-21 @ 12:49) (96% - 98%)  Wt(kg): --

## 2021-01-21 NOTE — CONSULT NOTE ADULT - ASSESSMENT
84 yo F with PMH HTN, thyroid d/o, IBS-C admitted with cough and fever with known COVID exposure found to be COVID POSITIVE.    Chronic Constipation with IBS-C - baseline regimen of Miralax TID, MOM PRN and Linzesse. Clinically not obstructed, +BM 1/20 and no acute GI pathology on CT A/P. tolerating PO diet    RECS:  -Emotional support/re-assurance given; pt was in video consultation with Psych prior to this consult  -resume home bowel regimen (ordered) - Miralax increased to TID and MOM daily PRN added. Continue Linzesse  -Add Simethicone QID  -PO diet as tolerated  -Continue Pepcid for GI prophylaxis  -avoid/limit narcotics as they will exacerbate pt's IBS-C symptoms    Discussed with pt, all questions answered. Pt may resume follow up with primary GI once discharged.  Discussed with Medicine team    Thank you for the courtesy of this consult.    Matheus Posada PA-C    [facility]  [phone]  After hours and weekend coverage [phone]

## 2021-01-21 NOTE — BEHAVIORAL HEALTH ASSESSMENT NOTE - NSBHCONSULTFOLLOWAFTERCARE_PSY_A_CORE FT
OP psych f/u at [facility]- [phone]  [facility] clinic- [phone]

## 2021-01-21 NOTE — BEHAVIORAL HEALTH ASSESSMENT NOTE - RISK ASSESSMENT
Low Acute Suicide Risk Risk factors: acute medical issues    Protective factors: no current SIIP/HIIP, no h/o SA/SIB, no h/o psych admissions, no active substance abuse, domiciled, intact marriage, social supports, positive therapeutic relationship, compliant with treatment, help-seeking behaviors    Pt is at low acute risk of harm; does not meet criteria for psychiatric admission

## 2021-01-21 NOTE — BEHAVIORAL HEALTH ASSESSMENT NOTE - SUMMARY
85F domiciled with , retired, with PPhx anxiety on Paxil and Valium PRN per PCP, no prior psych admissions or SA, no substance abuse issues, with PMH significant for IBS, HTN and thyroid disorder presented to the ED and admitted for LUQ pain, fevers and cough x1 day, found to be Covid+.  Patient treated for mild acute kidney injury and presumed cystitis, received IV antibiotics for presumed bacterial pneumonia with negative CTT, currently on IV Rocephin for presumed cystitis, psychiatry consulted for management of agitation as pt was noted to be screaming and calling out repeatedly. Pt AOx3 on interview, cooperative, but labile and frustrated about her abdominal pain, perceives the staff is not as responsive to her as she wishes they were.  Denies AVH, denies SI/HI, denies history of alcohol abuse or drugs.  Intermittently clutching her abdomen, calling out in pain.

## 2021-01-21 NOTE — BEHAVIORAL HEALTH ASSESSMENT NOTE - HPI (INCLUDE ILLNESS QUALITY, SEVERITY, DURATION, TIMING, CONTEXT, MODIFYING FACTORS, ASSOCIATED SIGNS AND SYMPTOMS)
85F domiciled with , retired, with PPhx anxiety on Paxil and Valium PRN per PCP, no prior psych admissions or SA, no substance abuse issues, with PMH significant for IBS, HTN and thyroid disorder presented to the ED and admitted for LUQ pain, fevers and cough x1 day, found to be Covid+.  Patient treated for mild acute kidney injury and presumed cystitis, received IV antibiotics for presumed bacterial pneumonia with negative CTT, currently on IV Rocephin for presumed cystitis, psychiatry consulted for management of agitation as pt was noted to be screaming and calling out repeatedly.     On interview, pt is cooperative, forthcoming, but expressing frustration about her abdominal pain. Patient states that the staff has been "ignoring her pain," and "won't give her Miralax". Patient states she was told she needs to see psychiatry because of her screaming, but she doesn't think she needs it, stating "I don't have any issues" and her main concern is getting her pain under control.  Patient denies visual/auditory hallucinations, denies suicidal ideation or homicidal ideation.  Denies history of alcohol abuse, denies illicit drugs.  Denies prior psych admissions.  Patient is alert and oriented to time, place and person, able to state her full name and age.  Patient reports her  is also COVID-19 positive, both of them contracted the virus from exposure with brother-in-law.   States she shares a loving and supportive relationship with her spouse; they spend their time together and home and doing errands, states her  is also currently hospitalized with COVID.    Interview intermittently interrupted by pt clutching her stomach in pain, crying, calling out for Miralax or for help.    Per nursing, pt has been agitated, labile, screaming repeatedly, asking for meds for her abd pain, difficult to redirect, coming into hallway.  They have not noted her to be confused or disoriented.

## 2021-01-21 NOTE — BEHAVIORAL HEALTH ASSESSMENT NOTE - NSBHCONSULTMEDS_PSY_A_CORE FT
C/w home dose Paxil (please clarify dose-- ?40mg/day on admission med rec)    Consider switching from Xanax to Valium 5mg PO BID PRN anxiety if no contraindication from pulm standpoint (pt's home med)    melatonin 3mg PO qHS PRN insomnia

## 2021-01-21 NOTE — BEHAVIORAL HEALTH ASSESSMENT NOTE - NSBHCHARTREVIEWINVESTIGATE_PSY_A_CORE FT
Ventricular Rate 97 BPM    Atrial Rate 97 BPM    P-R Interval 128 ms    QRS Duration 78 ms    Q-T Interval 360 ms    QTC Calculation(Bazett) 457 ms    P Axis -11 degrees    R Axis 59 degrees    T Axis 97 degrees    Diagnosis Line NORMAL SINUS RHYTHM  NONSPECIFIC ST ABNORMALITY  ABNORMAL ECG  NO PREVIOUS ECGS AVAILABLE  Confirmed by MD LISSA, BARRY ([id number]) on 1/20/2021 2:25:44 PM

## 2021-01-21 NOTE — BEHAVIORAL HEALTH ASSESSMENT NOTE - NSBHCHARTREVIEWLAB_PSY_A_CORE FT
12.0   6.80  )-----------( 232      ( 20 Jan 2021 07:24 )             36.6   01-20    137  |  99  |  25<H>  ----------------------------<  100<H>  4.0   |  22  |  1.32<H>    Ca    9.4      20 Jan 2021 07:20    TPro  7.3  /  Alb  3.9  /  TBili  0.2  /  DBili  x   /  AST  33  /  ALT  20  /  AlkPhos  57  01-20

## 2021-01-22 LAB
ALBUMIN SERPL ELPH-MCNC: 3.5 G/DL — SIGNIFICANT CHANGE UP (ref 3.3–5)
ALP SERPL-CCNC: 53 U/L — SIGNIFICANT CHANGE UP (ref 40–120)
ALT FLD-CCNC: 22 U/L — SIGNIFICANT CHANGE UP (ref 10–45)
ANION GAP SERPL CALC-SCNC: 14 MMOL/L — SIGNIFICANT CHANGE UP (ref 5–17)
AST SERPL-CCNC: 45 U/L — HIGH (ref 10–40)
BILIRUB SERPL-MCNC: 0.2 MG/DL — SIGNIFICANT CHANGE UP (ref 0.2–1.2)
BUN SERPL-MCNC: 24 MG/DL — HIGH (ref 7–23)
CALCIUM SERPL-MCNC: 8.8 MG/DL — SIGNIFICANT CHANGE UP (ref 8.4–10.5)
CHLORIDE SERPL-SCNC: 102 MMOL/L — SIGNIFICANT CHANGE UP (ref 96–108)
CO2 SERPL-SCNC: 21 MMOL/L — LOW (ref 22–31)
CREAT SERPL-MCNC: 1.17 MG/DL — SIGNIFICANT CHANGE UP (ref 0.5–1.3)
GLUCOSE SERPL-MCNC: 116 MG/DL — HIGH (ref 70–99)
POTASSIUM SERPL-MCNC: 4 MMOL/L — SIGNIFICANT CHANGE UP (ref 3.5–5.3)
POTASSIUM SERPL-SCNC: 4 MMOL/L — SIGNIFICANT CHANGE UP (ref 3.5–5.3)
PROT SERPL-MCNC: 6.9 G/DL — SIGNIFICANT CHANGE UP (ref 6–8.3)
SODIUM SERPL-SCNC: 137 MMOL/L — SIGNIFICANT CHANGE UP (ref 135–145)

## 2021-01-22 PROCEDURE — 99232 SBSQ HOSP IP/OBS MODERATE 35: CPT

## 2021-01-22 RX ORDER — OXYCODONE AND ACETAMINOPHEN 5; 325 MG/1; MG/1
1 TABLET ORAL ONCE
Refills: 0 | Status: DISCONTINUED | OUTPATIENT
Start: 2021-01-22 | End: 2021-01-22

## 2021-01-22 RX ADMIN — SIMETHICONE 80 MILLIGRAM(S): 80 TABLET, CHEWABLE ORAL at 12:35

## 2021-01-22 RX ADMIN — POLYETHYLENE GLYCOL 3350 17 GRAM(S): 17 POWDER, FOR SOLUTION ORAL at 18:29

## 2021-01-22 RX ADMIN — OXYCODONE AND ACETAMINOPHEN 1 TABLET(S): 5; 325 TABLET ORAL at 12:30

## 2021-01-22 RX ADMIN — Medication 30 MILLIGRAM(S): at 12:35

## 2021-01-22 RX ADMIN — LINACLOTIDE 290 MICROGRAM(S): 145 CAPSULE, GELATIN COATED ORAL at 04:39

## 2021-01-22 RX ADMIN — SIMVASTATIN 20 MILLIGRAM(S): 20 TABLET, FILM COATED ORAL at 20:31

## 2021-01-22 RX ADMIN — LIDOCAINE 1 PATCH: 4 CREAM TOPICAL at 00:23

## 2021-01-22 RX ADMIN — ENOXAPARIN SODIUM 40 MILLIGRAM(S): 100 INJECTION SUBCUTANEOUS at 04:39

## 2021-01-22 RX ADMIN — LIDOCAINE 1 PATCH: 4 CREAM TOPICAL at 22:02

## 2021-01-22 RX ADMIN — MAGNESIUM HYDROXIDE 30 MILLILITER(S): 400 TABLET, CHEWABLE ORAL at 00:33

## 2021-01-22 RX ADMIN — OXYCODONE AND ACETAMINOPHEN 1 TABLET(S): 5; 325 TABLET ORAL at 20:31

## 2021-01-22 RX ADMIN — BUDESONIDE AND FORMOTEROL FUMARATE DIHYDRATE 2 PUFF(S): 160; 4.5 AEROSOL RESPIRATORY (INHALATION) at 18:29

## 2021-01-22 RX ADMIN — SIMETHICONE 80 MILLIGRAM(S): 80 TABLET, CHEWABLE ORAL at 18:29

## 2021-01-22 RX ADMIN — Medication 25 MILLIGRAM(S): at 04:39

## 2021-01-22 RX ADMIN — SIMETHICONE 80 MILLIGRAM(S): 80 TABLET, CHEWABLE ORAL at 22:02

## 2021-01-22 RX ADMIN — POLYETHYLENE GLYCOL 3350 17 GRAM(S): 17 POWDER, FOR SOLUTION ORAL at 12:35

## 2021-01-22 RX ADMIN — LIDOCAINE 1 PATCH: 4 CREAM TOPICAL at 12:35

## 2021-01-22 RX ADMIN — SIMETHICONE 80 MILLIGRAM(S): 80 TABLET, CHEWABLE ORAL at 04:39

## 2021-01-22 RX ADMIN — Medication 150 MICROGRAM(S): at 04:39

## 2021-01-22 RX ADMIN — FAMOTIDINE 20 MILLIGRAM(S): 10 INJECTION INTRAVENOUS at 12:35

## 2021-01-22 RX ADMIN — POLYETHYLENE GLYCOL 3350 17 GRAM(S): 17 POWDER, FOR SOLUTION ORAL at 04:39

## 2021-01-22 RX ADMIN — LIDOCAINE 1 PATCH: 4 CREAM TOPICAL at 20:08

## 2021-01-22 RX ADMIN — BUDESONIDE AND FORMOTEROL FUMARATE DIHYDRATE 2 PUFF(S): 160; 4.5 AEROSOL RESPIRATORY (INHALATION) at 04:39

## 2021-01-22 RX ADMIN — ENOXAPARIN SODIUM 40 MILLIGRAM(S): 100 INJECTION SUBCUTANEOUS at 18:29

## 2021-01-22 NOTE — PROVIDER CONTACT NOTE (OTHER) - ACTION/TREATMENT ORDERED:
Notified PA of situation, ordered to administer percocet, completed. Pt still requesting milk of magnesia and to speak to PA. Notified PA, PA will go to bedside to talk to pt. Will monitor.
ANAY Holden aware of situation. No further orders given.

## 2021-01-22 NOTE — PROVIDER CONTACT NOTE (OTHER) - REASON
Received pt w/ no IV access
Pt asking for Miralax & milk of magnesia, doses already administered earlier as ordered

## 2021-01-22 NOTE — PROGRESS NOTE ADULT - ATTENDING COMMENTS
I have seen, examined and discussed patient with ANAY Jarvis. I agree with above assessment and plan.    [facility]

## 2021-01-22 NOTE — PROVIDER CONTACT NOTE (OTHER) - SITUATION
Received pt w/ no IV access. Pt stable, supposed to be getting discharged tomorrow 1/23. Pt has no IV medications. Refusing PIV access anyways.

## 2021-01-22 NOTE — PROVIDER CONTACT NOTE (OTHER) - ASSESSMENT
Py A&Ox4, c/o of abdominal pain, had BM today 1/21/21 Py A&Ox4, c/o of abdominal pain, bowel sounds present, had BM today 1/21/21

## 2021-01-23 ENCOUNTER — TRANSCRIPTION ENCOUNTER (OUTPATIENT)
Age: 86
End: 2021-01-23

## 2021-01-23 VITALS
DIASTOLIC BLOOD PRESSURE: 63 MMHG | TEMPERATURE: 99 F | SYSTOLIC BLOOD PRESSURE: 137 MMHG | HEART RATE: 72 BPM | OXYGEN SATURATION: 94 % | RESPIRATION RATE: 20 BRPM

## 2021-01-23 RX ORDER — RIVAROXABAN 15 MG-20MG
1 KIT ORAL
Qty: 30 | Refills: 0
Start: 2021-01-23 | End: 2021-02-21

## 2021-01-23 RX ORDER — DIAZEPAM 5 MG
1 TABLET ORAL
Qty: 10 | Refills: 0
Start: 2021-01-23

## 2021-01-23 RX ORDER — SIMETHICONE 80 MG/1
1 TABLET, CHEWABLE ORAL
Qty: 0 | Refills: 0 | DISCHARGE
Start: 2021-01-23

## 2021-01-23 RX ORDER — POLYETHYLENE GLYCOL 3350 17 G/17G
17 POWDER, FOR SOLUTION ORAL
Qty: 0 | Refills: 0 | DISCHARGE
Start: 2021-01-23

## 2021-01-23 RX ORDER — OXYCODONE AND ACETAMINOPHEN 5; 325 MG/1; MG/1
1 TABLET ORAL ONCE
Refills: 0 | Status: DISCONTINUED | OUTPATIENT
Start: 2021-01-23 | End: 2021-01-23

## 2021-01-23 RX ORDER — ACETAMINOPHEN 500 MG
2 TABLET ORAL
Qty: 0 | Refills: 0 | DISCHARGE
Start: 2021-01-23

## 2021-01-23 RX ORDER — METOPROLOL TARTRATE 50 MG
1 TABLET ORAL
Qty: 0 | Refills: 0 | DISCHARGE
Start: 2021-01-23

## 2021-01-23 RX ORDER — MAGNESIUM HYDROXIDE 400 MG/1
30 TABLET, CHEWABLE ORAL
Qty: 0 | Refills: 0 | DISCHARGE
Start: 2021-01-23

## 2021-01-23 RX ORDER — LEVOTHYROXINE SODIUM 125 MCG
0.5 TABLET ORAL
Qty: 0 | Refills: 0 | DISCHARGE

## 2021-01-23 RX ORDER — MENTHOL AND METHYL SALICYLATE 10; 30 G/100G; G/100G
1 STICK TOPICAL
Qty: 0 | Refills: 0 | DISCHARGE

## 2021-01-23 RX ORDER — LEVOTHYROXINE SODIUM 125 MCG
1 TABLET ORAL
Qty: 0 | Refills: 0 | DISCHARGE
Start: 2021-01-23

## 2021-01-23 RX ORDER — LIDOCAINE 4 G/100G
1 CREAM TOPICAL
Qty: 30 | Refills: 0
Start: 2021-01-23 | End: 2021-02-21

## 2021-01-23 RX ORDER — LINACLOTIDE 145 UG/1
1 CAPSULE, GELATIN COATED ORAL
Qty: 0 | Refills: 0 | DISCHARGE

## 2021-01-23 RX ORDER — METOPROLOL TARTRATE 50 MG
1 TABLET ORAL
Qty: 0 | Refills: 0 | DISCHARGE

## 2021-01-23 RX ORDER — FAMOTIDINE 10 MG/ML
1 INJECTION INTRAVENOUS
Qty: 30 | Refills: 0
Start: 2021-01-23 | End: 2021-02-21

## 2021-01-23 RX ORDER — LEVOTHYROXINE SODIUM 125 MCG
1 TABLET ORAL
Qty: 0 | Refills: 0 | DISCHARGE

## 2021-01-23 RX ORDER — GABAPENTIN 400 MG/1
1 CAPSULE ORAL
Qty: 0 | Refills: 0 | DISCHARGE

## 2021-01-23 RX ORDER — SIMVASTATIN 20 MG/1
1 TABLET, FILM COATED ORAL
Qty: 0 | Refills: 0 | DISCHARGE
Start: 2021-01-23

## 2021-01-23 RX ORDER — SIMVASTATIN 20 MG/1
1 TABLET, FILM COATED ORAL
Qty: 0 | Refills: 0 | DISCHARGE

## 2021-01-23 RX ORDER — DIAZEPAM 5 MG
1 TABLET ORAL
Qty: 0 | Refills: 0 | DISCHARGE
Start: 2021-01-23

## 2021-01-23 RX ORDER — FAMOTIDINE 10 MG/ML
1 INJECTION INTRAVENOUS
Qty: 0 | Refills: 0 | DISCHARGE
Start: 2021-01-23

## 2021-01-23 RX ADMIN — POLYETHYLENE GLYCOL 3350 17 GRAM(S): 17 POWDER, FOR SOLUTION ORAL at 05:10

## 2021-01-23 RX ADMIN — FAMOTIDINE 20 MILLIGRAM(S): 10 INJECTION INTRAVENOUS at 10:54

## 2021-01-23 RX ADMIN — BUDESONIDE AND FORMOTEROL FUMARATE DIHYDRATE 2 PUFF(S): 160; 4.5 AEROSOL RESPIRATORY (INHALATION) at 17:20

## 2021-01-23 RX ADMIN — OXYCODONE AND ACETAMINOPHEN 1 TABLET(S): 5; 325 TABLET ORAL at 17:50

## 2021-01-23 RX ADMIN — SIMETHICONE 80 MILLIGRAM(S): 80 TABLET, CHEWABLE ORAL at 05:10

## 2021-01-23 RX ADMIN — POLYETHYLENE GLYCOL 3350 17 GRAM(S): 17 POWDER, FOR SOLUTION ORAL at 10:56

## 2021-01-23 RX ADMIN — ENOXAPARIN SODIUM 40 MILLIGRAM(S): 100 INJECTION SUBCUTANEOUS at 05:09

## 2021-01-23 RX ADMIN — MAGNESIUM HYDROXIDE 30 MILLILITER(S): 400 TABLET, CHEWABLE ORAL at 11:56

## 2021-01-23 RX ADMIN — Medication 25 MILLIGRAM(S): at 05:10

## 2021-01-23 RX ADMIN — LIDOCAINE 1 PATCH: 4 CREAM TOPICAL at 10:54

## 2021-01-23 RX ADMIN — SIMETHICONE 80 MILLIGRAM(S): 80 TABLET, CHEWABLE ORAL at 10:56

## 2021-01-23 RX ADMIN — Medication 30 MILLIGRAM(S): at 10:55

## 2021-01-23 RX ADMIN — OXYCODONE AND ACETAMINOPHEN 1 TABLET(S): 5; 325 TABLET ORAL at 10:55

## 2021-01-23 RX ADMIN — Medication 650 MILLIGRAM(S): at 06:02

## 2021-01-23 RX ADMIN — BUDESONIDE AND FORMOTEROL FUMARATE DIHYDRATE 2 PUFF(S): 160; 4.5 AEROSOL RESPIRATORY (INHALATION) at 05:19

## 2021-01-23 RX ADMIN — OXYCODONE AND ACETAMINOPHEN 1 TABLET(S): 5; 325 TABLET ORAL at 00:23

## 2021-01-23 RX ADMIN — LINACLOTIDE 290 MICROGRAM(S): 145 CAPSULE, GELATIN COATED ORAL at 05:10

## 2021-01-23 RX ADMIN — Medication 150 MICROGRAM(S): at 05:10

## 2021-01-23 NOTE — PROGRESS NOTE ADULT - ASSESSMENT
85 f with COVID-19 infection in the setting of patient with no present oxygen requirements in the setting of patient with essential HTN< reported thyroid disorder (patient does not know her medications) with mild acute kidney injury and presumed cystitis.  Received IV antibiotics for presumed bacterial pneumonia but CTT chest does not corroborate this diagnosis.  Will continue with IV Rocephin for now for the presumed cystitis with symptoms but at present, patient does not require supplemental oxygenation and Decadron is currently not indicated in the setting of current Emergency Use Authorization by the FDA.       CTT abdomen nondiagnostic for LUQ pain.    2019 novel coronavirus disease (COVID-19).  - continue with IV Rocephin for presumed cystitis for now.  At present, no oxygen requirements, with mild TYLER.   - follow Inflammatory indices   - ID evaluation     Left upper quadrant abdominal pain.   -  Non diagnostic CTT abdomen.  Pepcid 20 mg daily for now.     Acute kidney injury.  - follow lytes, cr     Primary hypertension.   - control.     Breast calcification, right.  - Breast US.     Thyroid disorder.   - TSH.    Need for prophylactic measure.   - Patient agrees to pharmacologic DVT prophylaxis.     Gomez Wu MD pager [phone] 
 85 f with COVID-19 infection in the setting of patient with no present oxygen requirements in the setting of patient with essential HTN< reported thyroid disorder (patient does not know her medications) with mild acute kidney injury and presumed cystitis.  Received IV antibiotics for presumed bacterial pneumonia but CTT chest does not corroborate this diagnosis.  Will continue with IV Rocephin for now for the presumed cystitis with symptoms but at present, patient does not require supplemental oxygenation and Decadron is currently not indicated in the setting of current Emergency Use Authorization by the FDA.       CTT abdomen nondiagnostic for LUQ pain.    2019 novel coronavirus disease (COVID-19).  - continue with IV Rocephin for presumed cystitis for now.  At present, no oxygen requirements, with mild TYLER.   - follow Inflammatory indices   - ID evaluation noted    Left upper quadrant abdominal pain.   -  Non diagnostic CTT abdomen.  Pepcid 20 mg daily for now.   - Gastroenterology evaluation    Constipation  - bowel regimen    Acute kidney injury.  - follow lytes, cr     Primary hypertension.   - control.     Breast calcification, right.  - Breast US.     Thyroid disorder.   - TSH.    Need for prophylactic measure.   - Patient agrees to pharmacologic DVT prophylaxis.     Gomez Wu MD pager [phone] 
86 yo female with history of IBS, HTN, and thyroid disorder is admitted to hospital on 1/20 with fever and Covid exposure.  She was documented to have Covid, has a mild case, her O2 sats have been normal and no signs of Covid on CT.  She has had a multitude of complaints, GI and oral, likely not Covid related.She also has been anxious,crying.  Suggest:  1.Monitor off antibiotics  2.No role for remdesivir/decadron at this point.  3.No signs of ongoing infection, additional w/u per primary service and GI at this point.No ID objection to discharge planning
 85 f with COVID-19 infection in the setting of patient with no present oxygen requirements in the setting of patient with essential HTN< reported thyroid disorder (patient does not know her medications) with mild acute kidney injury and presumed cystitis.  Received IV antibiotics for presumed bacterial pneumonia but CTT chest does not corroborate this diagnosis.  Will continue with IV Rocephin for now for the presumed cystitis with symptoms but at present, patient does not require supplemental oxygenation and Decadron is currently not indicated in the setting of current Emergency Use Authorization by the FDA.       CTT abdomen nondiagnostic for LUQ pain.    2019 novel coronavirus disease (COVID-19).  - At present, no oxygen requirements, with mild TYLER.   - follow Inflammatory indices   - ID follow noted. Cleared for DC    Left upper quadrant abdominal pain.   -  Non diagnostic CTT abdomen.  Pepcid 20 mg daily for now.   - Gastroenterology follow    Constipation resolved  - bowel regimen    Acute kidney injury resolved.  - follow lytes, cr     Primary hypertension.   - control.     Breast calcification, right.  - Breast US as OTP.     Thyroid disorder.   - TSH.    Need for prophylactic measure.   - Patient agrees to pharmacologic DVT prophylaxis.     DC home. Follow with PMD in 3-4 days. QA    Gomez Wu MD pager [phone] 
84 yo female with history of IBS, HTn, and thyroid disorder is admitted to hospital on 1/20 with fever and Covid exposure.  She was documented to have Covid, has a mild case, her O2 sats have been normal and no signs of Covid on CT.  She has had a multitude of complaints, GI and oral, likely not Covid related.  Suggest:  1.Monitor off antibiotics  2.No role for remdesivir/decadron at this point.  3.No signs of ongoing infection, additional w/u per primary service and GI at this point.
 85 f with COVID-19 infection in the setting of patient with no present oxygen requirements in the setting of patient with essential HTN< reported thyroid disorder (patient does not know her medications) with mild acute kidney injury and presumed cystitis.  Received IV antibiotics for presumed bacterial pneumonia but CTT chest does not corroborate this diagnosis.  Will continue with IV Rocephin for now for the presumed cystitis with symptoms but at present, patient does not require supplemental oxygenation and Decadron is currently not indicated in the setting of current Emergency Use Authorization by the FDA.       CTT abdomen nondiagnostic for LUQ pain.    2019 novel coronavirus disease (COVID-19).  - At present, no oxygen requirements, with mild TYLER.   - follow Inflammatory indices   - ID follow noted    Left upper quadrant abdominal pain.   -  Non diagnostic CTT abdomen.  Pepcid 20 mg daily for now.   - Gastroenterology follow    Constipation  - bowel regimen    Acute kidney injury.  - follow lytes, cr     Primary hypertension.   - control.     Breast calcification, right.  - Breast US as OTP.     Thyroid disorder.   - TSH.    Need for prophylactic measure.   - Patient agrees to pharmacologic DVT prophylaxis.     DCP home.    Gomez Wu MD pager [phone] 
86 yo F with PMH HTN, thyroid d/o, IBS-C admitted with cough and fever with known COVID exposure found to be COVID POSITIVE.    Chronic Constipation with IBS-C - baseline regimen of Miralax TID, MOM PRN and Linzesse. Clinically not obstructed, +BM 1/20 and no acute GI pathology on CT A/P. tolerating PO diet    RECS:  -Emotional support/re-assurance given  -continue  home bowel regimen- Miralax TID and MOM daily PRN added. Continue Linzesse daily  -Simethicone QID  -Encouraged ambulation within room and exercises while sitting/in bed  -PO diet as tolerated  -Continue Pepcid for GI prophylaxis  -Pt counseled to avoid/limit narcotics as they will exacerbate pt's IBS-C symptoms    Discussed with pt, all questions answered. Pt may resume follow up with primary GI once discharged.  Discussed with Medicine team  d/c planning per Medicine.    Please call over weekend prn with acute GI concerns   GI service : [phone]      Matheus Posada PA-C    [facility]  [phone]  After hours and weekend coverage [phone]

## 2021-01-23 NOTE — CHART NOTE - NSCHARTNOTEFT_GEN_A_CORE
Patient was schedule to be picked up by ambulance today at 4 pm.  Reported by RN around 4:45 pm that patient was found on the floor.  Seen and evaluate the patient. Found her lying down on her L side   outside the bathroom, she was holding her head up and was leaning  against the hepa filter. Loose stool was on the floor. She is on laxative.    Patient said, she wanted to have a bowel movement, walked to the bathroom,   then it came suddenly, she lowered herself to the floor  She says she didn't hit anywhere, also she  said 'I know what I am doing."  CT scan head and X ray hips were recommended by Dr Wu. but  patient refused and insisting to go home. DW  Bear her nephew  he also said to avoid any test and to send her home. Patient was schedule to be picked up by ambulance today at 4 pm.  Reported by RN around 4:45 pm that patient was found on the floor.  Seen and evaluate the patient. Found her lying down on her L side   outside the bathroom, she was holding her head up and was leaning  against the hepa filter. Loose stool was on the floor. She is on laxative.    Patient said, she wanted to have a bowel movement, walked to the bathroom,   then it came suddenly, she lowered herself to the floor  She says she didn't hit anywhere, also she  said 'I know what I am doing."  CT scan head and X ray hips were recommended by Dr Wu. But  patient refused and insisting to go home despite explained to her need of   those images to rule out any injury. But she kept on saying she didnn't fall  and didn't hit anywhere. MARBIN  Bear her nephew  he also said to avoid any test and to send her home. On exam no apparent  injury, normal ROM of all extremities. MARBIN Wu, agreed with discharge. Patient was schedule to be picked up by ambulance today at 4 pm.  Reported by RN around 4:45 pm that patient was found on the floor.  Seen and evaluate the patient. Found her lying down on her L side   outside the bathroom, she was holding her head up and was leaning  against the hepa filter. Loose stool was on the floor. She is on laxative.    Patient said, she wanted to have a bowel movement, walked to the bathroom,   then it came suddenly, she lowered herself to the floor  She says she didn't hit anywhere, also she  said 'I know what I am doing."  CT scan head and X ray hips were recommended by Dr Wu. But  patient refused and insisting to go home despite explained to her need of   those images to rule out any injury. But she kept on saying she didnn't fall  and didn't hit anywhere. MARBIN Mr Sifuentes her nephew  he also said to avoid any test and to send her home. On exam no apparent  injury, normal ROM of all extremities. MARBIN Wu, agreed with discharge.  VS: T 98.9, /63, pulse ox 94% RA RR 20  Karis Griffiths NP  [phone]

## 2021-01-23 NOTE — DISCHARGE NOTE NURSING/CASE MANAGEMENT/SOCIAL WORK - PATIENT PORTAL LINK FT
You can access the FollowMyHealth Patient Portal offered by [facility] by registering at the following website: [email or web address]. By joining [employer]’s FollowMyHealth portal, you will also be able to view your health information using other applications (apps) compatible with our system.

## 2021-01-23 NOTE — PROGRESS NOTE ADULT - PROVIDER SPECIALTY LIST ADULT
Gastroenterology
Infectious Disease
Infectious Disease
Internal Medicine

## 2021-01-23 NOTE — PROGRESS NOTE ADULT - SUBJECTIVE AND OBJECTIVE BOX
CC: f/u for Covid infection    Patient reports: she c/o mouth/tooth  pain and pain all over, is asking for narcotics    REVIEW OF SYSTEMS:  All other review of systems negative (Comprehensive ROS): no dyspnea    Antimicrobials Day #  :    Other Medications Reviewed    T(F): 97.7 (01-22-21 @ 04:26), Max: 98.3 (01-22-21 @ 00:08)  HR: 83 (01-22-21 @ 04:26)  BP: 136/81 (01-22-21 @ 04:26)  RR: 18 (01-22-21 @ 04:26)  SpO2: 97% (01-22-21 @ 04:26)  Wt(kg): --    PHYSICAL EXAM:  General: alert, no acute distress  Eyes:  anicteric, no conjunctival injection, no discharge  Oropharynx: no lesions or injection 	  Neck: supple, without adenopathy  Lungs: clear to auscultation  Heart: regular rate and rhythm; no murmur, rubs or gallops  Abdomen: soft, nondistended, nontender, without mass or organomegaly  Skin: no lesions  Extremities: no clubbing, cyanosis, or edema  Neurologic: alert, oriented, moves all extremities    LAB RESULTS:      CRP .43    D dimer 505        MICROBIOLOGY:  RECENT CULTURES:  01-20 @ 01:30 .Urine Clean Catch (Midstream)     <10,000 CFU/mL Normal Urogenital Katei          RADIOLOGY REVIEWED:    < from: CT Chest No Cont (01.19.21 @ 20:34) >  IMPRESSION:    No focal consolidation.    Nonspecific pulmonary nodules. If the patient is at low risk for malignancy, no further follow-up is needed. If the patient is at high risk, 12 month follow-up CT chest may be obtained for further evaluation.    Additional findings as described.    < end of copied text >  
Patient is a 85y old  Female who presented with a chief complaint of Fever, cough x 1 day, LUQ pain (22 Jan 2021 08:56)      INTERVAL HPI/OVERNIGHT EVENTS:  +BMs - brown, several yesterday PM and 1 this AM  appetite good, no nausea or vomiting  still with intermittent spasm type left sided abdominal discomfort - admits to limited ambulation and taking percocet for pain  also c/o right ear and tooth/dental pain - requesting percocet    still anxious but reported to have some improvement from yesterday    MEDICATIONS  (STANDING):  budesonide 160 MICROgram(s)/formoterol 4.5 MICROgram(s) Inhaler 2 Puff(s) Inhalation two times a day  enoxaparin Injectable 40 milliGRAM(s) SubCutaneous every 12 hours  famotidine    Tablet 20 milliGRAM(s) Oral daily  influenza   Vaccine 0.5 milliLiter(s) IntraMuscular once  levothyroxine 150 MICROGram(s) Oral daily  lidocaine   Patch 1 Patch Transdermal daily  linaclotide 290 MICROGram(s) Oral before breakfast  metoprolol succinate ER 25 milliGRAM(s) Oral daily  PARoxetine 30 milliGRAM(s) Oral daily  polyethylene glycol 3350 17 Gram(s) Oral <User Schedule>  simethicone 80 milliGRAM(s) Chew four times a day  simvastatin 20 milliGRAM(s) Oral at bedtime    MEDICATIONS  (PRN):  acetaminophen   Tablet .. 650 milliGRAM(s) Oral every 6 hours PRN Temp greater or equal to 38C (100.4F), Mild Pain (1 - 3)  diazepam    Tablet 5 milliGRAM(s) Oral two times a day PRN anxiety/agitation  magnesium hydroxide Suspension 30 milliLiter(s) Oral daily PRN Constipation  melatonin 3 milliGRAM(s) Oral at bedtime PRN Insomnia  oxycodone    5 mG/acetaminophen 325 mG 1 Tablet(s) Oral every 12 hours PRN Severe Pain (7 - 10)      Allergies  No Known Allergies    Intolerances  Broccoli (Stomach Upset)  Cheese (Stomach Upset)      Review of Systems:  General:  No wt loss, chills, night sweats +fever - resolved  CV:  No pain, palpitations, +HTN  Resp:  No dyspnea,  tachypnea, wheezing +cough (resolved)  GI:  see HPI  :  No pain, bleeding, incontinence, nocturia  Muscle:  No pain, weakness  Neuro:  No focal weakness, tingling, memory problems  Psych:  No fatigue, insomnia, mood problems, depression  Endocrine:  No polyuria, polydypsia, cold/heat intolerance  Heme:  No petechiae, ecchymosis, easy bruisability  Skin:  No rash, tattoos, scars, edema        Vital Signs Last 24 Hrs  T(C): 36.8 (22 Jan 2021 14:01), Max: 37.2 (22 Jan 2021 09:54)  T(F): 98.2 (22 Jan 2021 14:01), Max: 98.9 (22 Jan 2021 09:54)  HR: 77 (22 Jan 2021 14:01) (72 - 85)  BP: 129/79 (22 Jan 2021 14:01) (107/64 - 139/77)  BP(mean): --  RR: 18 (22 Jan 2021 14:01) (18 - 18)  SpO2: 96% (22 Jan 2021 14:01) (93% - 98%)    PHYSICAL EXAM:  Constitutional: resting comfortably. awoken for exam and then appears anxious,  non toxic appearing elderly WF  alert and responsive  Mouth: poor dentition, no plaques  Neck: No LAD, supple no JVD  Respiratory: Clear, no accessory muscle use. no wheezing  Cardiovascular: S1 and S2, RRR  Gastrointestinal: BS+, obese soft, ND  generalized tenderness to mild/moderate palpation Left side - decreased/none with distraction. no rebound or rigidity  Extremities: warm, good pulses b/l  no ulcers  Vascular: 2+ peripheral pulses  Neurological: A/O x 3, no focal deficits  Psychiatric: anxious appearing  Skin: No rashes, anicteric. no lesions/ulcerations      LABS:    01-22    137  |  102  |  24<H>  ----------------------------<  116<H>  4.0   |  21<L>  |  1.17    Ca    8.8      22 Jan 2021 11:07    TPro  6.9  /  Alb  3.5  /  TBili  0.2  /  DBili  x   /  AST  45<H>  /  ALT  22  /  AlkPhos  53  01-22          RADIOLOGY & ADDITIONAL TESTS:  
Patient is a 85y old  Female who presents with a chief complaint of Fever, cough x 1 day, LUQ pain (22 Jan 2021 14:41)      SUBJECTIVE / OVERNIGHT EVENTS: No new complaints.   Review of Systems  chest pain no  palpitations no  sob no  nausea no  headache no    MEDICATIONS  (STANDING):  budesonide 160 MICROgram(s)/formoterol 4.5 MICROgram(s) Inhaler 2 Puff(s) Inhalation two times a day  enoxaparin Injectable 40 milliGRAM(s) SubCutaneous every 12 hours  famotidine    Tablet 20 milliGRAM(s) Oral daily  influenza   Vaccine 0.5 milliLiter(s) IntraMuscular once  levothyroxine 150 MICROGram(s) Oral daily  lidocaine   Patch 1 Patch Transdermal daily  linaclotide 290 MICROGram(s) Oral before breakfast  metoprolol succinate ER 25 milliGRAM(s) Oral daily  PARoxetine 30 milliGRAM(s) Oral daily  polyethylene glycol 3350 17 Gram(s) Oral <User Schedule>  simethicone 80 milliGRAM(s) Chew four times a day  simvastatin 20 milliGRAM(s) Oral at bedtime    MEDICATIONS  (PRN):  acetaminophen   Tablet .. 650 milliGRAM(s) Oral every 6 hours PRN Temp greater or equal to 38C (100.4F), Mild Pain (1 - 3)  diazepam    Tablet 5 milliGRAM(s) Oral two times a day PRN anxiety/agitation  magnesium hydroxide Suspension 30 milliLiter(s) Oral daily PRN Constipation  melatonin 3 milliGRAM(s) Oral at bedtime PRN Insomnia  oxycodone    5 mG/acetaminophen 325 mG 1 Tablet(s) Oral every 12 hours PRN Severe Pain (7 - 10)      Vital Signs Last 24 Hrs  T(C): 36.9 (22 Jan 2021 21:21), Max: 37.2 (22 Jan 2021 09:54)  T(F): 98.4 (22 Jan 2021 21:21), Max: 99 (22 Jan 2021 17:29)  HR: 74 (22 Jan 2021 21:21) (69 - 85)  BP: 120/55 (22 Jan 2021 21:21) (107/64 - 136/81)  BP(mean): --  RR: 20 (22 Jan 2021 21:21) (18 - 20)  SpO2: 97% (22 Jan 2021 21:21) (95% - 98%)    PHYSICAL EXAM:  GENERAL: NAD, well-developed  HEAD:  Atraumatic, Normocephalic  EYES: EOMI, PERRLA, conjunctiva and sclera clear  NECK: Supple, No JVD  CHEST/LUNG: Clear to auscultation bilaterally; No wheeze  HEART: Regular rate and rhythm; No murmurs, rubs, or gallops  ABDOMEN: Soft, Nontender, Nondistended; Bowel sounds present  EXTREMITIES:  2+ Peripheral Pulses, No clubbing, cyanosis, or edema  PSYCH: Anxious  NEUROLOGY: non-focal  SKIN: No rashes or lesions    LABS:    01-22    137  |  102  |  24<H>  ----------------------------<  116<H>  4.0   |  21<L>  |  1.17    Ca    8.8      22 Jan 2021 11:07    TPro  6.9  /  Alb  3.5  /  TBili  0.2  /  DBili  x   /  AST  45<H>  /  ALT  22  /  AlkPhos  53  01-22              Culture - Urine (collected 20 Jan 2021 01:30)  Source: .Urine Clean Catch (Midstream)  Final Report (20 Jan 2021 20:44):    <10,000 CFU/mL Normal Urogenital Katie        RADIOLOGY & ADDITIONAL TESTS:    Imaging Personally Reviewed:    Consultant(s) Notes Reviewed:      Care Discussed with Consultants/Other Providers:  
Patient is a 85y old  Female who presents with a chief complaint of Fever, cough x 1 day, LUQ pain (23 Jan 2021 07:14)      SUBJECTIVE / OVERNIGHT EVENTS: Comfortable without new complaints.   Review of Systems  chest pain no  palpitations no  sob no  nausea no  headache no    MEDICATIONS  (STANDING):  budesonide 160 MICROgram(s)/formoterol 4.5 MICROgram(s) Inhaler 2 Puff(s) Inhalation two times a day  enoxaparin Injectable 40 milliGRAM(s) SubCutaneous every 12 hours  famotidine    Tablet 20 milliGRAM(s) Oral daily  influenza   Vaccine 0.5 milliLiter(s) IntraMuscular once  levothyroxine 150 MICROGram(s) Oral daily  lidocaine   Patch 1 Patch Transdermal daily  linaclotide 290 MICROGram(s) Oral before breakfast  metoprolol succinate ER 25 milliGRAM(s) Oral daily  PARoxetine 30 milliGRAM(s) Oral daily  polyethylene glycol 3350 17 Gram(s) Oral <User Schedule>  simethicone 80 milliGRAM(s) Chew four times a day  simvastatin 20 milliGRAM(s) Oral at bedtime    MEDICATIONS  (PRN):  acetaminophen   Tablet .. 650 milliGRAM(s) Oral every 6 hours PRN Temp greater or equal to 38C (100.4F), Mild Pain (1 - 3)  diazepam    Tablet 5 milliGRAM(s) Oral two times a day PRN anxiety/agitation  magnesium hydroxide Suspension 30 milliLiter(s) Oral daily PRN Constipation  melatonin 3 milliGRAM(s) Oral at bedtime PRN Insomnia  oxycodone    5 mG/acetaminophen 325 mG 1 Tablet(s) Oral every 12 hours PRN Severe Pain (7 - 10)      Vital Signs Last 24 Hrs  T(C): 36.9 (23 Jan 2021 12:35), Max: 37.4 (23 Jan 2021 08:49)  T(F): 98.5 (23 Jan 2021 12:35), Max: 99.3 (23 Jan 2021 08:49)  HR: 73 (23 Jan 2021 12:35) (62 - 78)  BP: 125/57 (23 Jan 2021 12:35) (115/65 - 153/78)  BP(mean): --  RR: 20 (23 Jan 2021 12:35) (18 - 20)  SpO2: 95% (23 Jan 2021 12:35) (95% - 99%)    PHYSICAL EXAM:  GENERAL: NAD, well-developed  HEAD:  Atraumatic, Normocephalic  EYES: EOMI, PERRLA, conjunctiva and sclera clear  NECK: Supple, No JVD  CHEST/LUNG: Clear to auscultation bilaterally; No wheeze  HEART: Regular rate and rhythm; No murmurs, rubs, or gallops  ABDOMEN: Soft, Nontender, Nondistended; Bowel sounds present  EXTREMITIES:  2+ Peripheral Pulses, No clubbing, cyanosis, or edema  PSYCH: anxious  NEUROLOGY: non-focal  SKIN: No rashes or lesions    LABS:    01-22    137  |  102  |  24<H>  ----------------------------<  116<H>  4.0   |  21<L>  |  1.17    Ca    8.8      22 Jan 2021 11:07    TPro  6.9  /  Alb  3.5  /  TBili  0.2  /  DBili  x   /  AST  45<H>  /  ALT  22  /  AlkPhos  53  01-22                RADIOLOGY & ADDITIONAL TESTS:    Imaging Personally Reviewed:    Consultant(s) Notes Reviewed:      Care Discussed with Consultants/Other Providers:  
CC: f/u for Covid    Patient reports: no respiratory symptoms, anxious and restless, crying about her meds    REVIEW OF SYSTEMS:  All other review of systems negative (Comprehensive ROS): intermitent abdominal pain    Antimicrobials Day #  :    Other Medications Reviewed    T(F): 98.8 (01-23-21 @ 05:07), Max: 99.1 (01-22-21 @ 23:50)  HR: 62 (01-23-21 @ 05:07)  BP: 151/73 (01-23-21 @ 05:07)  RR: 18 (01-23-21 @ 05:07)  SpO2: 99% (01-23-21 @ 05:07)  Wt(kg): --    PHYSICAL EXAM:  General: alert, no acute distress  Eyes:  anicteric, no conjunctival injection, no discharge  Oropharynx: no lesions or injection 	  Neck: supple, without adenopathy  Lungs: clear to auscultation  Heart: regular rate and rhythm; no murmur, rubs or gallops  Abdomen: soft, nondistended, nontender, without mass or organomegaly  Skin: no lesions  Extremities: no clubbing, cyanosis, or edema  Neurologic: alert, oriented, anxious    LAB RESULTS:    01-22    137  |  102  |  24<H>  ----------------------------<  116<H>  4.0   |  21<L>  |  1.17    Ca    8.8      22 Jan 2021 11:07    TPro  6.9  /  Alb  3.5  /  TBili  0.2  /  DBili  x   /  AST  45<H>  /  ALT  22  /  AlkPhos  53  01-22    LIVER FUNCTIONS - ( 22 Jan 2021 11:07 )  Alb: 3.5 g/dL / Pro: 6.9 g/dL / ALK PHOS: 53 U/L / ALT: 22 U/L / AST: 45 U/L / GGT: x             MICROBIOLOGY:  RECENT CULTURES:  01-20 @ 01:30 .Urine Clean Catch (Midstream)     <10,000 CFU/mL Normal Urogenital Katie          RADIOLOGY REVIEWED:    < from: CT Chest No Cont (01.19.21 @ 20:34) >    IMPRESSION:    No focal consolidation.    Nonspecific pulmonary nodules. If the patient is at low risk for malignancy, no further follow-up is needed. If the patient is at high risk, 12 month follow-up CT chest may be obtained for further evaluation.    Additional findings as described.    < end of copied text >  
Patient is a 85y old  Female who presents with a chief complaint of Fever, cough x 1 day, LUQ pain (20 Jan 2021 11:05)      SUBJECTIVE / OVERNIGHT EVENTS: Comfortable without new complaints.   Review of Systems  chest pain no  palpitations no  sob no  nausea no  headache no    MEDICATIONS  (STANDING):  cefTRIAXone   IVPB 1000 milliGRAM(s) IV Intermittent every 24 hours  enoxaparin Injectable 40 milliGRAM(s) SubCutaneous every 12 hours  famotidine    Tablet 20 milliGRAM(s) Oral daily  influenza   Vaccine 0.5 milliLiter(s) IntraMuscular once  lidocaine   Patch 1 Patch Transdermal daily  linaclotide 290 MICROGram(s) Oral before breakfast    MEDICATIONS  (PRN):  acetaminophen   Tablet .. 650 milliGRAM(s) Oral every 6 hours PRN Temp greater or equal to 38C (100.4F), Mild Pain (1 - 3)  ALPRAZolam 0.25 milliGRAM(s) Oral every 12 hours PRN anxiety  oxycodone    5 mG/acetaminophen 325 mG 1 Tablet(s) Oral every 12 hours PRN Severe Pain (7 - 10)      Vital Signs Last 24 Hrs  T(C): 36.7 (20 Jan 2021 16:05), Max: 37.2 (20 Jan 2021 05:49)  T(F): 98 (20 Jan 2021 16:05), Max: 98.9 (20 Jan 2021 05:49)  HR: 75 (20 Jan 2021 16:05) (75 - 94)  BP: 117/74 (20 Jan 2021 16:05) (106/74 - 152/80)  BP(mean): --  RR: 18 (20 Jan 2021 16:05) (18 - 18)  SpO2: 96% (20 Jan 2021 16:05) (94% - 99%)    PHYSICAL EXAM:  GENERAL: NAD, well-developed  HEAD:  Atraumatic, Normocephalic  EYES: EOMI, PERRLA, conjunctiva and sclera clear  NECK: Supple, No JVD  CHEST/LUNG: Clear to auscultation bilaterally; No wheeze  HEART: Regular rate and rhythm; No murmurs, rubs, or gallops  ABDOMEN: Soft, Nontender, Nondistended; Bowel sounds present  EXTREMITIES:  2+ Peripheral Pulses, No clubbing, cyanosis, or edema  PSYCH: AAOx3  NEUROLOGY: non-focal  SKIN: No rashes or lesions    LABS:                        12.0   6.80  )-----------( 232      ( 20 Jan 2021 07:24 )             36.6     01-20    137  |  99  |  25<H>  ----------------------------<  100<H>  4.0   |  22  |  1.32<H>    Ca    9.4      20 Jan 2021 07:20    TPro  7.3  /  Alb  3.9  /  TBili  0.2  /  DBili  x   /  AST  33  /  ALT  20  /  AlkPhos  57  01-20    PT/INR - ( 19 Jan 2021 16:07 )   PT: 12.6 sec;   INR: 1.05 ratio         PTT - ( 19 Jan 2021 16:07 )  PTT:26.3 sec  CARDIAC MARKERS ( 20 Jan 2021 04:41 )  x     / x     / 178 U/L / x     / x          Urinalysis Basic - ( 19 Jan 2021 21:39 )    Color: Light Yellow / Appearance: Clear / SG: >1.050 / pH: x  Gluc: x / Ketone: Negative  / Bili: Negative / Urobili: Negative   Blood: x / Protein: Trace / Nitrite: Negative   Leuk Esterase: Moderate / RBC: 1 /hpf / WBC 22 /HPF   Sq Epi: x / Non Sq Epi: 2 / Bacteria: Negative          RADIOLOGY & ADDITIONAL TESTS:    Imaging Personally Reviewed:    Consultant(s) Notes Reviewed:      Care Discussed with Consultants/Other Providers:  
Patient is a 85y old  Female who presents with a chief complaint of Fever, cough x 1 day, LUQ pain (21 Jan 2021 10:20)      SUBJECTIVE / OVERNIGHT EVENTS: c/o LUQ spasm. + constipation  Review of Systems  chest pain no  palpitations no  sob no  nausea no  headache no    MEDICATIONS  (STANDING):  budesonide 160 MICROgram(s)/formoterol 4.5 MICROgram(s) Inhaler 2 Puff(s) Inhalation two times a day  enoxaparin Injectable 40 milliGRAM(s) SubCutaneous every 12 hours  famotidine    Tablet 20 milliGRAM(s) Oral daily  influenza   Vaccine 0.5 milliLiter(s) IntraMuscular once  levothyroxine 150 MICROGram(s) Oral daily  lidocaine   Patch 1 Patch Transdermal daily  linaclotide 290 MICROGram(s) Oral before breakfast  metoprolol succinate ER 25 milliGRAM(s) Oral daily  PARoxetine 30 milliGRAM(s) Oral daily  polyethylene glycol 3350 17 Gram(s) Oral daily  simvastatin 20 milliGRAM(s) Oral at bedtime    MEDICATIONS  (PRN):  acetaminophen   Tablet .. 650 milliGRAM(s) Oral every 6 hours PRN Temp greater or equal to 38C (100.4F), Mild Pain (1 - 3)  ALPRAZolam 0.25 milliGRAM(s) Oral every 12 hours PRN anxiety  oxycodone    5 mG/acetaminophen 325 mG 1 Tablet(s) Oral every 12 hours PRN Severe Pain (7 - 10)      Vital Signs Last 24 Hrs  T(C): 36.7 (21 Jan 2021 08:37), Max: 36.9 (20 Jan 2021 19:55)  T(F): 98.1 (21 Jan 2021 08:37), Max: 98.5 (20 Jan 2021 19:55)  HR: 83 (21 Jan 2021 08:37) (75 - 90)  BP: 112/62 (21 Jan 2021 08:37) (112/62 - 135/77)  BP(mean): --  RR: 18 (21 Jan 2021 08:37) (18 - 18)  SpO2: 98% (21 Jan 2021 08:37) (96% - 98%)    PHYSICAL EXAM:  GENERAL: NAD, well-developed   HEAD:  Atraumatic, Normocephalic  EYES: EOMI, PERRLA, conjunctiva and sclera clear  NECK: Supple, No JVD  CHEST/LUNG: Clear to auscultation bilaterally; No wheeze  HEART: Regular rate and rhythm; No murmurs, rubs, or gallops  ABDOMEN: Soft, Nontender, Nondistended; Bowel sounds present  EXTREMITIES:  2+ Peripheral Pulses, No clubbing, cyanosis, or edema  PSYCH: AAOx3  NEUROLOGY: non-focal  SKIN: No rashes or lesions    LABS:                        12.0   6.80  )-----------( 232      ( 20 Jan 2021 07:24 )             36.6     01-20    137  |  99  |  25<H>  ----------------------------<  100<H>  4.0   |  22  |  1.32<H>    Ca    9.4      20 Jan 2021 07:20    TPro  7.3  /  Alb  3.9  /  TBili  0.2  /  DBili  x   /  AST  33  /  ALT  20  /  AlkPhos  57  01-20    PT/INR - ( 19 Jan 2021 16:07 )   PT: 12.6 sec;   INR: 1.05 ratio         PTT - ( 19 Jan 2021 16:07 )  PTT:26.3 sec  CARDIAC MARKERS ( 20 Jan 2021 04:41 )  x     / x     / 178 U/L / x     / x          Urinalysis Basic - ( 19 Jan 2021 21:39 )    Color: Light Yellow / Appearance: Clear / SG: >1.050 / pH: x  Gluc: x / Ketone: Negative  / Bili: Negative / Urobili: Negative   Blood: x / Protein: Trace / Nitrite: Negative   Leuk Esterase: Moderate / RBC: 1 /hpf / WBC 22 /HPF   Sq Epi: x / Non Sq Epi: 2 / Bacteria: Negative        Culture - Urine (collected 20 Jan 2021 01:30)  Source: .Urine Clean Catch (Midstream)  Final Report (20 Jan 2021 20:44):    <10,000 CFU/mL Normal Urogenital Katie        RADIOLOGY & ADDITIONAL TESTS:    Imaging Personally Reviewed:    Consultant(s) Notes Reviewed:      Care Discussed with Consultants/Other Providers:

## 2021-01-23 NOTE — PROGRESS NOTE ADULT - REASON FOR ADMISSION
Fever, cough x 1 day, LUQ pain

## 2021-03-16 PROCEDURE — 85379 FIBRIN DEGRADATION QUANT: CPT

## 2021-03-16 PROCEDURE — 96375 TX/PRO/DX INJ NEW DRUG ADDON: CPT | Mod: XU

## 2021-03-16 PROCEDURE — 85730 THROMBOPLASTIN TIME PARTIAL: CPT

## 2021-03-16 PROCEDURE — 96374 THER/PROPH/DIAG INJ IV PUSH: CPT | Mod: XU

## 2021-03-16 PROCEDURE — U0005: CPT

## 2021-03-16 PROCEDURE — 83615 LACTATE (LD) (LDH) ENZYME: CPT

## 2021-03-16 PROCEDURE — 97110 THERAPEUTIC EXERCISES: CPT

## 2021-03-16 PROCEDURE — 85610 PROTHROMBIN TIME: CPT

## 2021-03-16 PROCEDURE — 86140 C-REACTIVE PROTEIN: CPT

## 2021-03-16 PROCEDURE — 86769 SARS-COV-2 COVID-19 ANTIBODY: CPT

## 2021-03-16 PROCEDURE — 71045 X-RAY EXAM CHEST 1 VIEW: CPT

## 2021-03-16 PROCEDURE — 94640 AIRWAY INHALATION TREATMENT: CPT

## 2021-03-16 PROCEDURE — 82550 ASSAY OF CK (CPK): CPT

## 2021-03-16 PROCEDURE — 80053 COMPREHEN METABOLIC PANEL: CPT

## 2021-03-16 PROCEDURE — 71250 CT THORAX DX C-: CPT

## 2021-03-16 PROCEDURE — 81001 URINALYSIS AUTO W/SCOPE: CPT

## 2021-03-16 PROCEDURE — 97116 GAIT TRAINING THERAPY: CPT

## 2021-03-16 PROCEDURE — 84443 ASSAY THYROID STIM HORMONE: CPT

## 2021-03-16 PROCEDURE — 93005 ELECTROCARDIOGRAM TRACING: CPT

## 2021-03-16 PROCEDURE — 97161 PT EVAL LOW COMPLEX 20 MIN: CPT

## 2021-03-16 PROCEDURE — 82728 ASSAY OF FERRITIN: CPT

## 2021-03-16 PROCEDURE — 74177 CT ABD & PELVIS W/CONTRAST: CPT

## 2021-03-16 PROCEDURE — 85025 COMPLETE CBC W/AUTO DIFF WBC: CPT

## 2021-03-16 PROCEDURE — 84484 ASSAY OF TROPONIN QUANT: CPT

## 2021-03-16 PROCEDURE — 87086 URINE CULTURE/COLONY COUNT: CPT

## 2021-03-16 PROCEDURE — 99285 EMERGENCY DEPT VISIT HI MDM: CPT | Mod: 25

## 2021-03-16 PROCEDURE — U0003: CPT

## 2022-06-29 NOTE — DISCHARGE NOTE PROVIDER - EXTENDED VTE YES NO FOR MLM ASPIRIN
, Paramedian Forehead Flap Text: A decision was made to reconstruct the defect utilizing an interpolation axial flap and a staged reconstruction.  A telfa template was made of the defect.  This telfa template was then used to outline the paramedian forehead pedicle flap.  The donor area for the pedicle flap was then injected with anesthesia.  The flap was excised through the skin and subcutaneous tissue down to the layer of the underlying musculature.  The pedicle flap was carefully excised within this deep plane to maintain its blood supply.  The edges of the donor site were undermined.   The donor site was closed in a primary fashion.  The pedicle was then rotated into position and sutured.  Once the tube was sutured into place, adequate blood supply was confirmed with blanching and refill.  The pedicle was then wrapped with xeroform gauze and dressed appropriately with a telfa and gauze bandage to ensure continued blood supply and protect the attached pedicle.

## 2023-01-12 NOTE — ED ADULT NURSE NOTE - NS ED NOTE ABUSE SUSPICION NEGLECT YN
No Azelaic Acid Pregnancy And Lactation Text: This medication is considered safe during pregnancy and breast feeding.
